# Patient Record
Sex: FEMALE | URBAN - METROPOLITAN AREA
[De-identification: names, ages, dates, MRNs, and addresses within clinical notes are randomized per-mention and may not be internally consistent; named-entity substitution may affect disease eponyms.]

---

## 2017-02-03 RX ORDER — HYDROCHLOROTHIAZIDE 12.5 MG/1
CAPSULE ORAL
Qty: 30 CAP | Refills: 0 | Status: SHIPPED | OUTPATIENT
Start: 2017-02-03 | End: 2018-04-10 | Stop reason: ALTCHOICE

## 2017-02-03 NOTE — LETTER
2/6/2017 9:11 AM 
 
Ms. Lakesha Uriostegui [address] [address] Dear Ms. Maldonado: 
 
We've missed you! Please call our office at [phone] and schedule a follow up appointment for your continued care. Patient overdue for appointment with Dr Ulises Grey for follow up on issues and medications. Sincerely, Garett Pablo MD

## 2017-02-08 ENCOUNTER — TELEPHONE (OUTPATIENT)
Dept: INTERNAL MEDICINE CLINIC | Age: 57
End: 2017-02-08

## 2017-02-08 NOTE — TELEPHONE ENCOUNTER
Patient wants to know if she will need to do labs at her appt because if so she would schedule a am appt instead of pm appt. Requesting a return call.

## 2017-02-08 NOTE — TELEPHONE ENCOUNTER
Writer left detailed message for patient stating that Dr Charity Petersen will do labs at appointment so it is best to make an appointment for in the morning, asked for a return call back with any questions or concerns.

## 2017-02-28 ENCOUNTER — OFFICE VISIT (OUTPATIENT)
Dept: INTERNAL MEDICINE CLINIC | Age: 57
End: 2017-02-28

## 2017-02-28 VITALS
RESPIRATION RATE: 14 BRPM | WEIGHT: 157 LBS | TEMPERATURE: 99.5 F | DIASTOLIC BLOOD PRESSURE: 85 MMHG | HEIGHT: 65 IN | OXYGEN SATURATION: 98 % | HEART RATE: 87 BPM | BODY MASS INDEX: 26.16 KG/M2 | SYSTOLIC BLOOD PRESSURE: 138 MMHG

## 2017-02-28 DIAGNOSIS — Z72.0 TOBACCO ABUSE DISORDER: ICD-10-CM

## 2017-02-28 DIAGNOSIS — I10 ESSENTIAL HYPERTENSION: Primary | ICD-10-CM

## 2017-02-28 DIAGNOSIS — R63.5 WEIGHT GAIN: ICD-10-CM

## 2017-02-28 RX ORDER — TRIAMTERENE/HYDROCHLOROTHIAZID 37.5-25 MG
1 TABLET ORAL DAILY
Qty: 90 TAB | Refills: 3 | Status: SHIPPED | OUTPATIENT
Start: 2017-02-28 | End: 2018-03-04 | Stop reason: SDUPTHER

## 2017-02-28 NOTE — PROGRESS NOTES
Subjective:   Keyana Barraza is a 49 y.o. female with hypertension. Current Outpatient Prescriptions   Medication Sig Dispense Refill    hydroCHLOROthiazide (MICROZIDE) 12.5 mg capsule TAKE 1 TABLET BY MOUTH EVERY DAY 30 Cap 0      Hypertension ROS: taking medications as instructed, no medication side effects noted, home BP monitoring in range of 258'T systolic over 23'H diastolic, no TIA's, no chest pain on exertion, no dyspnea on exertion, no swelling of ankles. New concerns: less stress. Happy good job    Wt Readings from Last 3 Encounters:   02/28/17 157 lb (71.2 kg)   01/12/16 154 lb (69.9 kg)   10/28/14 148 lb (67.1 kg)     Social History   Substance Use Topics    Smoking status: Current Every Day Smoker     Packs/day: 0.30     Years: 8.00     Types: Cigarettes    Smokeless tobacco: Never Used    Alcohol use No     Vitals:    02/28/17 1614 02/28/17 1633   BP: 139/80 138/85   Pulse: 87    Resp: 14    Temp: 99.5 °F (37.5 °C)    TempSrc: Oral    SpO2: 98%    Weight: 157 lb (71.2 kg)    Height: 5' 5\" (1.651 m)          Objective:   Visit Vitals    /80 (BP 1 Location: Left arm, BP Patient Position: Sitting)    Pulse 87    Temp 99.5 °F (37.5 °C) (Oral)    Resp 14    Ht 5' 5\" (1.651 m)    Wt 157 lb (71.2 kg)    SpO2 98%    BMI 26.13 kg/m2      Appearance alert, well appearing, and in no distress and oriented to person, place, and time. General exam BP noted to be  Borderline controlled today in office, S1, S2 normal, no gallop, no murmur, chest clear, no JVD, no HSM, no edema. Lab review: no lab studies available for review at time of visit.    Lab Results   Component Value Date/Time    Cholesterol, total 220 02/10/2016 07:48 AM    HDL Cholesterol 49 02/10/2016 07:48 AM    LDL, calculated 135 02/10/2016 07:48 AM    Triglyceride 182 02/10/2016 07:48 AM    CHOL/HDL Ratio 3.0 07/01/2010 09:55 AM     Lab Results   Component Value Date/Time    GFR est  02/10/2016 07:48 AM    GFR est non-YOGESH 103 02/10/2016 07:48 AM    Creatinine 0.63 02/10/2016 07:48 AM    BUN 7 02/10/2016 07:48 AM    Sodium 139 02/10/2016 07:48 AM    Potassium 3.9 02/10/2016 07:48 AM    Chloride 99 02/10/2016 07:48 AM    CO2 26 02/10/2016 07:48 AM      Wt Readings from Last 3 Encounters:   02/28/17 157 lb (71.2 kg)   01/12/16 154 lb (69.9 kg)   10/28/14 148 lb (67.1 kg)       Assessment:    Hypertension borderline controlled, stable. Will change to maxzide 25    Plan:   orders and follow up as documented in patient record  the following changes are made - lower the dose appropriate. Mansi Menchaca was seen today for hypertension. Diagnoses and all orders for this visit:    Essential hypertension  -     CBC WITH AUTOMATED DIFF  -     LIPID PANEL  -     METABOLIC PANEL, COMPREHENSIVE    Weight gain  -     CBC WITH AUTOMATED DIFF  -     TSH 3RD GENERATION    Tobacco abuse disorder    Other orders  -     triamterene-hydroCHLOROthiazide (MAXZIDE) 37.5-25 mg per tablet; Take 1 Tab by mouth daily. The patient was counseled on the dangers of tobacco use, and was advised to quit. Reviewed strategies to maximize success, including removing cigarettes and smoking materials from environment and stress management. The patient was counseled on the dangers of tobacco use, and was advised to quit. Reviewed strategies to maximize success, including removing cigarettes and smoking materials from environment, substitution of other forms of reinforcement and support of family/friends.

## 2017-02-28 NOTE — MR AVS SNAPSHOT
Visit Information Date & Time Provider Department Dept. Phone Encounter #  
 2/28/2017  4:00 PM Niko Diaz MD Formerly [facility] [phone] [id number] Upcoming Health Maintenance Date Due Hepatitis C Screening 1960 COLONOSCOPY 8/17/1980 Pneumococcal 19-64 Medium Risk (1 of 1 - PPSV23) 8/17/1981 DTaP/Tdap/Td series (1 - Tdap) 8/17/1983 BREAST CANCER SCRN MAMMOGRAM 8/17/2012 PAP AKA CERVICAL CYTOLOGY 4/7/2014 Allergies as of 2/28/2017  Review Complete On: 2/28/2017 By: Yoel Gutierrez LPN Severity Noted Reaction Type Reactions Codeine  05/12/2010    Other (comments)  
 hallucination Sulfa (Sulfonamide Antibiotics)  05/12/2010    Nausea Only Current Immunizations  Reviewed on 1/12/2016 No immunizations on file. Not reviewed this visit You Were Diagnosed With   
  
 Codes Comments Essential hypertension    -  Primary ICD-10-CM: I10 
ICD-9-CM: 401.9 Weight gain     ICD-10-CM: R63.5 ICD-9-CM: 783.1 Vitals BP  
  
  
  
  
  
 138/85 Vitals History BMI and BSA Data Body Mass Index Body Surface Area  
 26.13 kg/m 2 1.81 m 2 Preferred Pharmacy Pharmacy Name Phone [id number]- [place], [address] AT [email or web address] [phone] Your Updated Medication List  
  
   
This list is accurate as of: 2/28/17  4:45 PM.  Always use your most recent med list.  
  
  
  
  
 hydroCHLOROthiazide 12.5 mg capsule Commonly known as:  Marea Heaps TAKE 1 TABLET BY MOUTH EVERY DAY  
  
 triamterene-hydroCHLOROthiazide 37.5-25 mg per tablet Commonly known as:  Padma Sportsman Take 1 Tab by mouth daily. Prescriptions Sent to Pharmacy Refills  
 triamterene-hydroCHLOROthiazide (MAXZIDE) 37.5-25 mg per tablet 3 Sig: Take 1 Tab by mouth daily. Class: Normal  
 Pharmacy: [id number] - [place], [address]  AT [facility] #: [phone] Route: Oral  
  
We Performed the Following CBC WITH AUTOMATED DIFF [19623 CPT(R)] LIPID PANEL [51336 CPT(R)] METABOLIC PANEL, COMPREHENSIVE [70840 CPT(R)] TSH 3RD GENERATION [91142 CPT(R)] Introducing [facility]! [facility] introduces [employer] patient portal. Now you can access parts of your medical record, email your doctor's office, and request medication refills online. 1. In your internet browser, go to [email or web address] 2. Click on the First Time User? Click Here link in the Sign In box. You will see the New Member Sign Up page. 3. Enter your Crowdcube Access Code exactly as it appears below. You will not need to use this code after youve completed the sign-up process. If you do not sign up before the expiration date, you must request a new code. · Crowdcube Access Code: [email or web address] Expires: 5/29/2017  4:41 PM 
 
4. Enter the last four digits of your Social Security Number (xxxx) and Date of Birth (mm/dd/yyyy) as indicated and click Submit. You will be taken to the next sign-up page. 5. Create a Crowdcube ID. This will be your Crowdcube login ID and cannot be changed, so think of one that is secure and easy to remember. 6. Create a Crowdcube password. You can change your password at any time. 7. Enter your Password Reset Question and Answer. This can be used at a later time if you forget your password. 8. Enter your e-mail address. You will receive e-mail notification when new information is available in [address] 9. Click Sign Up. You can now view and download portions of your medical record. 10. Click the Download Summary menu link to download a portable copy of your medical information. If you have questions, please visit the Frequently Asked Questions section of the Crowdcube website. Remember, Crowdcube is NOT to be used for urgent needs. For medical emergencies, dial 911. Now available from your iPhone and Android! Please provide this summary of care documentation to your next provider. Your primary care clinician is listed as Tom Wiggins. If you have any questions after today's visit, please call [phone].

## 2017-03-03 LAB
ALBUMIN SERPL-MCNC: 4.4 G/DL (ref 3.5–5.5)
ALBUMIN/GLOB SERPL: 1.7 {RATIO} (ref 1.1–2.5)
ALP SERPL-CCNC: 84 IU/L (ref 39–117)
ALT SERPL-CCNC: 25 IU/L (ref 0–32)
AST SERPL-CCNC: 23 IU/L (ref 0–40)
BASOPHILS # BLD AUTO: 0 X10E3/UL (ref 0–0.2)
BASOPHILS NFR BLD AUTO: 0 %
BILIRUB SERPL-MCNC: 0.2 MG/DL (ref 0–1.2)
BUN SERPL-MCNC: 10 MG/DL (ref 6–24)
BUN/CREAT SERPL: 16 (ref 9–23)
CALCIUM SERPL-MCNC: 9.6 MG/DL (ref 8.7–10.2)
CHLORIDE SERPL-SCNC: 97 MMOL/L (ref 96–106)
CHOLEST SERPL-MCNC: 219 MG/DL (ref 100–199)
CO2 SERPL-SCNC: 27 MMOL/L (ref 18–29)
CREAT SERPL-MCNC: 0.63 MG/DL (ref 0.57–1)
EOSINOPHIL # BLD AUTO: 0.1 X10E3/UL (ref 0–0.4)
EOSINOPHIL NFR BLD AUTO: 1 %
ERYTHROCYTE [DISTWIDTH] IN BLOOD BY AUTOMATED COUNT: 14.1 % (ref 12.3–15.4)
GLOBULIN SER CALC-MCNC: 2.6 G/DL (ref 1.5–4.5)
GLUCOSE SERPL-MCNC: 100 MG/DL (ref 65–99)
HCT VFR BLD AUTO: 40.2 % (ref 34–46.6)
HDLC SERPL-MCNC: 48 MG/DL
HGB BLD-MCNC: 13.5 G/DL (ref 11.1–15.9)
IMM GRANULOCYTES # BLD: 0 X10E3/UL (ref 0–0.1)
IMM GRANULOCYTES NFR BLD: 0 %
INTERPRETATION, 910389: NORMAL
LDLC SERPL CALC-MCNC: 123 MG/DL (ref 0–99)
LYMPHOCYTES # BLD AUTO: 3.7 X10E3/UL (ref 0.7–3.1)
LYMPHOCYTES NFR BLD AUTO: 38 %
MCH RBC QN AUTO: 29.9 PG (ref 26.6–33)
MCHC RBC AUTO-ENTMCNC: 33.6 G/DL (ref 31.5–35.7)
MCV RBC AUTO: 89 FL (ref 79–97)
MONOCYTES # BLD AUTO: 0.5 X10E3/UL (ref 0.1–0.9)
MONOCYTES NFR BLD AUTO: 6 %
NEUTROPHILS # BLD AUTO: 5.4 X10E3/UL (ref 1.4–7)
NEUTROPHILS NFR BLD AUTO: 55 %
PLATELET # BLD AUTO: 318 X10E3/UL (ref 150–379)
POTASSIUM SERPL-SCNC: 3.5 MMOL/L (ref 3.5–5.2)
PROT SERPL-MCNC: 7 G/DL (ref 6–8.5)
RBC # BLD AUTO: 4.51 X10E6/UL (ref 3.77–5.28)
SODIUM SERPL-SCNC: 139 MMOL/L (ref 134–144)
TRIGL SERPL-MCNC: 238 MG/DL (ref 0–149)
TSH SERPL DL<=0.005 MIU/L-ACNC: 2.43 UIU/ML (ref 0.45–4.5)
VLDLC SERPL CALC-MCNC: 48 MG/DL (ref 5–40)
WBC # BLD AUTO: 9.7 X10E3/UL (ref 3.4–10.8)

## 2018-03-05 ENCOUNTER — TELEPHONE (OUTPATIENT)
Dept: INTERNAL MEDICINE CLINIC | Age: 58
End: 2018-03-05

## 2018-04-10 ENCOUNTER — OFFICE VISIT (OUTPATIENT)
Dept: INTERNAL MEDICINE CLINIC | Age: 58
End: 2018-04-10

## 2018-04-10 VITALS
HEIGHT: 65 IN | BODY MASS INDEX: 25.66 KG/M2 | SYSTOLIC BLOOD PRESSURE: 152 MMHG | OXYGEN SATURATION: 98 % | DIASTOLIC BLOOD PRESSURE: 86 MMHG | RESPIRATION RATE: 16 BRPM | WEIGHT: 154 LBS | HEART RATE: 74 BPM

## 2018-04-10 DIAGNOSIS — I10 ESSENTIAL HYPERTENSION: Primary | ICD-10-CM

## 2018-04-10 DIAGNOSIS — E78.5 DYSLIPIDEMIA: ICD-10-CM

## 2018-04-10 RX ORDER — LISINOPRIL AND HYDROCHLOROTHIAZIDE 20; 25 MG/1; MG/1
1 TABLET ORAL DAILY
Qty: 90 TAB | Refills: 3 | Status: SHIPPED | OUTPATIENT
Start: 2018-04-10 | End: 2018-07-18 | Stop reason: ALTCHOICE

## 2018-04-10 NOTE — PROGRESS NOTES
Coordination of Care Questions    1. Have you been to the ER, urgent care clinic since your last visit? No       Hospitalized since your last visit? No    2. Have you seen or consulted any other health care providers outside of the [address] [place] [address] since your last visit? Include any pap smears or colon screening.  No

## 2018-04-11 LAB
ALBUMIN SERPL-MCNC: 4.5 G/DL (ref 3.5–5.5)
ALBUMIN/GLOB SERPL: 2 {RATIO} (ref 1.2–2.2)
ALP SERPL-CCNC: 77 IU/L (ref 39–117)
ALT SERPL-CCNC: 16 IU/L (ref 0–32)
AST SERPL-CCNC: 19 IU/L (ref 0–40)
BILIRUB SERPL-MCNC: 0.4 MG/DL (ref 0–1.2)
BUN SERPL-MCNC: 10 MG/DL (ref 6–24)
BUN/CREAT SERPL: 14 (ref 9–23)
CALCIUM SERPL-MCNC: 9.7 MG/DL (ref 8.7–10.2)
CHLORIDE SERPL-SCNC: 98 MMOL/L (ref 96–106)
CHOLEST SERPL-MCNC: 212 MG/DL (ref 100–199)
CO2 SERPL-SCNC: 30 MMOL/L (ref 18–29)
CREAT SERPL-MCNC: 0.69 MG/DL (ref 0.57–1)
GFR SERPLBLD CREATININE-BSD FMLA CKD-EPI: 113 ML/MIN/1.73
GFR SERPLBLD CREATININE-BSD FMLA CKD-EPI: 98 ML/MIN/1.73
GLOBULIN SER CALC-MCNC: 2.3 G/DL (ref 1.5–4.5)
GLUCOSE SERPL-MCNC: 96 MG/DL (ref 65–99)
HDLC SERPL-MCNC: 52 MG/DL
INTERPRETATION, 910389: NORMAL
LDLC SERPL CALC-MCNC: 123 MG/DL (ref 0–99)
POTASSIUM SERPL-SCNC: 4.5 MMOL/L (ref 3.5–5.2)
PROT SERPL-MCNC: 6.8 G/DL (ref 6–8.5)
SODIUM SERPL-SCNC: 141 MMOL/L (ref 134–144)
TRIGL SERPL-MCNC: 184 MG/DL (ref 0–149)
VLDLC SERPL CALC-MCNC: 37 MG/DL (ref 5–40)

## 2018-06-04 RX ORDER — TRIAMTERENE/HYDROCHLOROTHIAZID 37.5-25 MG
1 TABLET ORAL DAILY
Qty: 90 TAB | Refills: 0 | Status: SHIPPED | OUTPATIENT
Start: 2018-06-04 | End: 2018-07-18 | Stop reason: SDUPTHER

## 2018-06-04 NOTE — TELEPHONE ENCOUNTER
Pt is calling she wants to speak with nurse in reference to the medication that  prescribed to her Lisinopril-Hydrochlorothiazide 20-25 mg.  Pt may be reached at [phone]

## 2018-06-04 NOTE — TELEPHONE ENCOUNTER
The patient is currently finishing up Triam/HCTZ and during her last visit it was discussed that she would change to Lisinopril/HCTZ. At this time, she does not feel comfortable switching meds. Her most recent readings have been 115/64 and 121/68. She would like a refill on Triam/HCTZ and will perhaps discuss changing at a later time with Dr Josselin Theodore.

## 2018-07-18 RX ORDER — TRIAMTERENE/HYDROCHLOROTHIAZID 37.5-25 MG
1 TABLET ORAL DAILY
Qty: 90 TAB | Refills: 1 | Status: SHIPPED | OUTPATIENT
Start: 2018-07-18 | End: 2019-02-25 | Stop reason: SDUPTHER

## 2019-02-26 RX ORDER — TRIAMTERENE/HYDROCHLOROTHIAZID 37.5-25 MG
1 TABLET ORAL DAILY
Qty: 90 TAB | Refills: 1 | Status: SHIPPED | OUTPATIENT
Start: 2019-02-26 | End: 2019-04-29 | Stop reason: SDUPTHER

## 2019-04-29 ENCOUNTER — OFFICE VISIT (OUTPATIENT)
Dept: INTERNAL MEDICINE CLINIC | Age: 59
End: 2019-04-29

## 2019-04-29 VITALS
HEIGHT: 65 IN | TEMPERATURE: 97.3 F | SYSTOLIC BLOOD PRESSURE: 127 MMHG | DIASTOLIC BLOOD PRESSURE: 85 MMHG | OXYGEN SATURATION: 100 % | WEIGHT: 154 LBS | RESPIRATION RATE: 18 BRPM | BODY MASS INDEX: 25.66 KG/M2 | HEART RATE: 89 BPM

## 2019-04-29 DIAGNOSIS — Z23 ENCOUNTER FOR IMMUNIZATION: ICD-10-CM

## 2019-04-29 DIAGNOSIS — E78.5 DYSLIPIDEMIA: ICD-10-CM

## 2019-04-29 DIAGNOSIS — Z72.0 TOBACCO ABUSE DISORDER: ICD-10-CM

## 2019-04-29 DIAGNOSIS — I10 ESSENTIAL HYPERTENSION: Primary | ICD-10-CM

## 2019-04-29 RX ORDER — TRIAMTERENE/HYDROCHLOROTHIAZID 37.5-25 MG
1 TABLET ORAL DAILY
Qty: 90 TAB | Refills: 3 | Status: SHIPPED | OUTPATIENT
Start: 2019-04-29 | End: 2020-05-27

## 2019-04-29 RX ORDER — TRIAMTERENE/HYDROCHLOROTHIAZID 37.5-25 MG
1 TABLET ORAL DAILY
Qty: 90 TAB | Refills: 1 | Status: SHIPPED | OUTPATIENT
Start: 2019-04-29 | End: 2019-04-29 | Stop reason: SDUPTHER

## 2019-04-29 NOTE — PROGRESS NOTES
1. Have you been to the ER, urgent care clinic since your last visit? Hospitalized since your last visit? No 
 
2. Have you seen or consulted any other health care providers outside of the [address] [place] [address] since your last visit? Include any pap smears or colon screening.  No

## 2019-04-29 NOTE — PROGRESS NOTES
Subjective:  
Kurtis Elkins is a 66 y.o. female with hypertension. Current Outpatient Medications Medication Sig Dispense Refill  triamterene-hydroCHLOROthiazide (MAXZIDE) 37.5-25 mg per tablet Take 1 Tab by mouth daily. 90 Tab 1 Hypertension ROS: taking medications as instructed, no medication side effects noted, home BP monitoring in range of 425'Y systolic over 76'O diastolic, no TIA's, no chest pain on exertion, no dyspnea on exertion, no swelling of ankles. New concerns: less stress. Happy good job Still smokes worried Wt Readings from Last 3 Encounters:  
04/29/19 154 lb (69.9 kg) 04/10/18 154 lb (69.9 kg) 02/28/17 157 lb (71.2 kg) Social History Tobacco Use  Smoking status: Current Every Day Smoker Packs/day: 0.30 Years: 8.00 Pack years: 2.40 Types: Cigarettes  Smokeless tobacco: Never Used Substance Use Topics  Alcohol use: No  
 Drug use: No  
 
Vitals:  
 04/29/19 1640 BP: 148/78 Pulse: 89 Resp: 18 Temp: 97.3 °F (36.3 °C) TempSrc: Oral  
SpO2: 100% Weight: 154 lb (69.9 kg) Height: 5' 5\" (1.651 m) Vitals:  
 04/29/19 1640 BP: 148/78 Pulse: 89 Resp: 18 Temp: 97.3 °F (36.3 °C) TempSrc: Oral  
SpO2: 100% Weight: 154 lb (69.9 kg) Height: 5' 5\" (1.651 m) Wt Readings from Last 3 Encounters:  
04/29/19 154 lb (69.9 kg) 04/10/18 154 lb (69.9 kg) 02/28/17 157 lb (71.2 kg) Objective:  
Visit Vitals /78 (BP 1 Location: Left arm, BP Patient Position: Sitting) Pulse 89 Temp 97.3 °F (36.3 °C) (Oral) Resp 18 Ht 5' 5\" (1.651 m) Wt 154 lb (69.9 kg) SpO2 100% BMI 25.63 kg/m² Appearance alert, well appearing, and in no distress and oriented to person, place, and time. General exam BP noted to be  Borderline controlled today in office, S1, S2 normal, no gallop, no murmur, chest clear, no JVD, no HSM, no edema. Lab review: no lab studies available for review at time of visit. Lab Results Component Value Date/Time Cholesterol, total 212 (H) 04/10/2018 09:14 AM  
 HDL Cholesterol 52 04/10/2018 09:14 AM  
 LDL, calculated 123 (H) 04/10/2018 09:14 AM  
 Triglyceride 184 (H) 04/10/2018 09:14 AM  
 CHOL/HDL Ratio 3.0 07/01/2010 09:55 AM  
 
Lab Results Component Value Date/Time GFR est  04/10/2018 09:14 AM  
 GFR est non-AA 98 04/10/2018 09:14 AM  
 Creatinine 0.69 04/10/2018 09:14 AM  
 BUN 10 04/10/2018 09:14 AM  
 Sodium 141 04/10/2018 09:14 AM  
 Potassium 4.5 04/10/2018 09:14 AM  
 Chloride 98 04/10/2018 09:14 AM  
 CO2 30 (H) 04/10/2018 09:14 AM  
  
Wt Readings from Last 3 Encounters:  
04/29/19 154 lb (69.9 kg) 04/10/18 154 lb (69.9 kg) 02/28/17 157 lb (71.2 kg) Assessment: Hypertension better controlled, stable. Plan:  
orders and follow up as documented in patient record 
the following changes are made - lower the dose appropriate. The patient was counseled on the dangers of tobacco use, and was advised to quit. Reviewed strategies to maximize success, including removing cigarettes and smoking materials from environment and stress management. The patient was counseled on the dangers of tobacco use, and was advised to quit. Reviewed strategies to maximize success, including removing cigarettes and smoking materials from environment, substitution of other forms of reinforcement and support of family/friends. Offered lung cancer screen Yearly flu vaccine Offered pneumovax Diagnoses and all orders for this visit: 1. Essential hypertension 2. Dyslipidemia 3. Tobacco abuse disorder Other orders 
-     triamterene-hydroCHLOROthiazide (MAXZIDE) 37.5-25 mg per tablet; Take 1 Tab by mouth daily.

## 2020-06-02 ENCOUNTER — VIRTUAL VISIT (OUTPATIENT)
Dept: INTERNAL MEDICINE CLINIC | Age: 60
End: 2020-06-02

## 2020-06-02 VITALS
SYSTOLIC BLOOD PRESSURE: 135 MMHG | BODY MASS INDEX: 25.66 KG/M2 | DIASTOLIC BLOOD PRESSURE: 80 MMHG | WEIGHT: 154 LBS | HEIGHT: 65 IN

## 2020-06-02 DIAGNOSIS — I10 ESSENTIAL HYPERTENSION: Primary | ICD-10-CM

## 2020-06-02 RX ORDER — TRIAMTERENE/HYDROCHLOROTHIAZID 37.5-25 MG
1 TABLET ORAL DAILY
Qty: 90 TAB | Refills: 1 | Status: SHIPPED | OUTPATIENT
Start: 2020-06-02 | End: 2021-03-02 | Stop reason: SDUPTHER

## 2020-06-02 NOTE — PROGRESS NOTES
Subjective: telephone only  Mikaela Elliott is a 64 y.o. female with hypertension. Current Outpatient Medications   Medication Sig Dispense Refill    triamterene-hydroCHLOROthiazide (MAXZIDE) 37.5-25 mg per tablet TAKE 1 TABLET BY MOUTH EVERY DAY 90 Tab 0      Hypertension ROS: taking medications as instructed, no medication side effects noted, home BP monitoring in range of 130systolic over 69'X diastolic, no TIA's, no chest pain on exertion, no dyspnea on exertion, no swelling of ankles. New concerns: less stress.  Happy good job  Still smokes worried       Wt Readings from Last 3 Encounters:   06/02/20 154 lb (69.9 kg)   04/29/19 154 lb (69.9 kg)   04/10/18 154 lb (69.9 kg)     Social History     Tobacco Use    Smoking status: Current Every Day Smoker     Packs/day: 0.30     Years: 8.00     Pack years: 2.40     Types: Cigarettes    Smokeless tobacco: Never Used   Substance Use Topics    Alcohol use: No    Drug use: No     Vitals:    06/02/20 1529   Weight: 154 lb (69.9 kg)   Height: 5' 5\" (1.651 m)     Vitals:    06/02/20 1529   Weight: 154 lb (69.9 kg)   Height: 5' 5\" (1.651 m)     Wt Readings from Last 3 Encounters:   06/02/20 154 lb (69.9 kg)   04/29/19 154 lb (69.9 kg)   04/10/18 154 lb (69.9 kg)     BP Readings from Last 3 Encounters:   06/02/20 135/80   04/29/19 127/85   04/10/18 152/86         Objective:   Visit Vitals  Ht 5' 5\" (1.651 m)   Wt 154 lb (69.9 kg)   BMI 25.63 kg/m²      Appearance alert, sounds fine    Lab Results   Component Value Date/Time    Cholesterol, total 212 (H) 04/10/2018 09:14 AM    HDL Cholesterol 52 04/10/2018 09:14 AM    LDL, calculated 123 (H) 04/10/2018 09:14 AM    Triglyceride 184 (H) 04/10/2018 09:14 AM    CHOL/HDL Ratio 3.0 07/01/2010 09:55 AM     Lab Results   Component Value Date/Time    GFR est  04/10/2018 09:14 AM    GFR est non-AA 98 04/10/2018 09:14 AM    Creatinine 0.69 04/10/2018 09:14 AM    BUN 10 04/10/2018 09:14 AM    Sodium 141 04/10/2018 09:14 AM Potassium 4.5 04/10/2018 09:14 AM    Chloride 98 04/10/2018 09:14 AM    CO2 30 (H) 04/10/2018 09:14 AM      Wt Readings from Last 3 Encounters:   06/02/20 154 lb (69.9 kg)   04/29/19 154 lb (69.9 kg)   04/10/18 154 lb (69.9 kg)       Assessment:    Hypertension  controlled, stable. Plan:   orders and follow up as documented in patient record  the following changes are made - lower the dose appropriate. The patient was counseled on the dangers of tobacco use, and was advised to quit. Reviewed strategies to maximize success, including removing cigarettes and smoking materials from environment and stress management. Diagnoses and all orders for this visit:    1. Essential hypertension    Other orders  -     triamterene-hydroCHLOROthiazide (MAXZIDE) 37.5-25 mg per tablet; Take 1 Tab by mouth daily.       Labs next time    Total time 16 minutes

## 2020-11-24 ENCOUNTER — PATIENT MESSAGE (OUTPATIENT)
Dept: INTERNAL MEDICINE CLINIC | Age: 60
End: 2020-11-24

## 2020-12-05 ENCOUNTER — TELEPHONE (OUTPATIENT)
Dept: INTERNAL MEDICINE CLINIC | Age: 60
End: 2020-12-05

## 2020-12-05 NOTE — TELEPHONE ENCOUNTER
LVM for patient to call office regarding her mammogram. New order needed or if she has had that done already. No MyChart set up, letter sent sent to patient home.

## 2020-12-05 NOTE — LETTER
12/5/2020 12:46 PM 
 
Ms. Kathleen Diop [address] [facility] [address] [address] [facility] and Dr. Camron Murrieta would like to remind you that our records indicate you are due for a mammogram. If you have recently had a mammogram please notify the office so that we may update your medical record accordingly. Please feel free to reach out to the office with your questions. Kind Regards,  
 
 
 
[address] [facility] [address] [facility], [address] [place], [address] [place] [address] [address]kw[address] Office: [phone] Fax: [phone] Sincerely, Arthur Farah MD

## 2021-03-02 ENCOUNTER — OFFICE VISIT (OUTPATIENT)
Dept: INTERNAL MEDICINE CLINIC | Age: 61
End: 2021-03-02
Payer: COMMERCIAL

## 2021-03-02 VITALS
HEIGHT: 65 IN | SYSTOLIC BLOOD PRESSURE: 140 MMHG | RESPIRATION RATE: 18 BRPM | WEIGHT: 173 LBS | DIASTOLIC BLOOD PRESSURE: 80 MMHG | TEMPERATURE: 98.4 F | HEART RATE: 80 BPM | BODY MASS INDEX: 28.82 KG/M2 | OXYGEN SATURATION: 100 %

## 2021-03-02 DIAGNOSIS — I10 ESSENTIAL HYPERTENSION: Primary | ICD-10-CM

## 2021-03-02 DIAGNOSIS — Z72.0 TOBACCO ABUSE: ICD-10-CM

## 2021-03-02 PROCEDURE — 99213 OFFICE O/P EST LOW 20 MIN: CPT | Performed by: INTERNAL MEDICINE

## 2021-03-02 RX ORDER — TRIAMTERENE/HYDROCHLOROTHIAZID 37.5-25 MG
1 TABLET ORAL DAILY
Qty: 90 TAB | Refills: 1 | Status: SHIPPED | OUTPATIENT
Start: 2021-03-02 | End: 2021-08-30

## 2021-03-02 NOTE — PROGRESS NOTES
Chief Complaint   Patient presents with    Blood Pressure Check     follow up    Anxiety    Medication Refill     90 day refills          1. Have you been to the ER, urgent care clinic since your last visit? Hospitalized since your last visit? No    2. Have you seen or consulted any other health care providers outside of the [address] [place] [address] since your last visit? Include any pap smears or colon screening.  No

## 2021-03-03 NOTE — PROGRESS NOTES
Subjective:   Phyllis Walker is a 64 y.o. female with hypertension. Current Outpatient Medications   Medication Sig Dispense Refill    triamterene-hydroCHLOROthiazide (MAXZIDE) 37.5-25 mg per tablet Take 1 Tab by mouth daily. 90 Tab 1      Hypertension ROS: taking medications as instructed, no medication side effects noted, home BP monitoring in range of 545'A systolic over 59'S diastolic, no TIA's, no chest pain on exertion, no dyspnea on exertion, no swelling of ankles. New concerns: less stress. Happy good job  Still smokes worried       Wt Readings from Last 3 Encounters:   03/02/21 173 lb (78.5 kg)   06/02/20 154 lb (69.9 kg)   04/29/19 154 lb (69.9 kg)     Social History     Tobacco Use    Smoking status: Current Every Day Smoker     Packs/day: 0.30     Years: 8.00     Pack years: 2.40     Types: Cigarettes    Smokeless tobacco: Never Used   Substance Use Topics    Alcohol use: No    Drug use: No     Vitals:    03/02/21 1519 03/02/21 1549   BP: (!) 185/73 (!) 140/80   Pulse: 80    Resp: 18    Temp: 98.4 °F (36.9 °C)    TempSrc: Temporal    SpO2: 100%    Weight: 173 lb (78.5 kg)    Height: 5' 5\" (1.651 m)      Vitals:    03/02/21 1519 03/02/21 1549   BP: (!) 185/73 (!) 140/80   Pulse: 80    Resp: 18    Temp: 98.4 °F (36.9 °C)    TempSrc: Temporal    SpO2: 100%    Weight: 173 lb (78.5 kg)    Height: 5' 5\" (1.651 m)      Wt Readings from Last 3 Encounters:   03/02/21 173 lb (78.5 kg)   06/02/20 154 lb (69.9 kg)   04/29/19 154 lb (69.9 kg)         Objective:   Visit Vitals  BP (!) 140/80   Pulse 80   Temp 98.4 °F (36.9 °C) (Temporal)   Resp 18   Ht 5' 5\" (1.651 m)   Wt 173 lb (78.5 kg)   SpO2 100%   BMI 28.79 kg/m²      Appearance alert, well appearing, and in no distress and oriented to person, place, and time. General exam BP noted to be  Borderline controlled today in office, S1, S2 normal, no gallop, no murmur, chest clear, no JVD, no HSM, no edema.    Lab review: no lab studies available for review at time of visit. Lab Results   Component Value Date/Time    Cholesterol, total 212 (H) 04/10/2018 09:14 AM    HDL Cholesterol 52 04/10/2018 09:14 AM    LDL, calculated 123 (H) 04/10/2018 09:14 AM    Triglyceride 184 (H) 04/10/2018 09:14 AM    CHOL/HDL Ratio 3.0 07/01/2010 09:55 AM     Lab Results   Component Value Date/Time    GFR est  04/10/2018 09:14 AM    GFR est non-AA 98 04/10/2018 09:14 AM    Creatinine 0.69 04/10/2018 09:14 AM    BUN 10 04/10/2018 09:14 AM    Sodium 141 04/10/2018 09:14 AM    Potassium 4.5 04/10/2018 09:14 AM    Chloride 98 04/10/2018 09:14 AM    CO2 30 (H) 04/10/2018 09:14 AM      Wt Readings from Last 3 Encounters:   03/02/21 173 lb (78.5 kg)   06/02/20 154 lb (69.9 kg)   04/29/19 154 lb (69.9 kg)       Assessment:    Hypertension better controlled, stable. Weight gain related to covid restrictions  Plan:   orders and follow up as documented in patient record  the following changes are made - lower the dose appropriate. The patient was counseled on the dangers of tobacco use, and was advised to quit. Reviewed strategies to maximize success, including removing cigarettes and smoking materials from environment and stress management. The patient was counseled on the dangers of tobacco use, and was advised to quit. Reviewed strategies to maximize success, including removing cigarettes and smoking materials from environment, substitution of other forms of reinforcement and support of family/friends. Offered lung cancer screen  Yearly flu vaccine  Offered pneumovax  covid vaccinations reviewed and priority reviewed and my advice to get vaccinated reviewed    Diagnoses and all orders for this visit:    1. Essential hypertension    2. Tobacco abuse    Other orders  -     triamterene-hydroCHLOROthiazide (MAXZIDE) 37.5-25 mg per tablet; Take 1 Tab by mouth daily.       Advise exercise   Stop smoking

## 2021-08-30 RX ORDER — TRIAMTERENE/HYDROCHLOROTHIAZID 37.5-25 MG
TABLET ORAL
Qty: 90 TABLET | Refills: 1 | Status: SHIPPED | OUTPATIENT
Start: 2021-08-30 | End: 2021-09-01 | Stop reason: ALTCHOICE

## 2021-09-01 ENCOUNTER — OFFICE VISIT (OUTPATIENT)
Dept: INTERNAL MEDICINE CLINIC | Age: 61
End: 2021-09-01
Payer: COMMERCIAL

## 2021-09-01 VITALS
WEIGHT: 169 LBS | SYSTOLIC BLOOD PRESSURE: 150 MMHG | HEART RATE: 83 BPM | OXYGEN SATURATION: 100 % | DIASTOLIC BLOOD PRESSURE: 70 MMHG | BODY MASS INDEX: 28.16 KG/M2 | HEIGHT: 65 IN | TEMPERATURE: 98.9 F | RESPIRATION RATE: 18 BRPM

## 2021-09-01 DIAGNOSIS — I10 ESSENTIAL HYPERTENSION: Primary | ICD-10-CM

## 2021-09-01 PROCEDURE — 99213 OFFICE O/P EST LOW 20 MIN: CPT | Performed by: INTERNAL MEDICINE

## 2021-09-01 RX ORDER — LOSARTAN POTASSIUM AND HYDROCHLOROTHIAZIDE 25; 100 MG/1; MG/1
1 TABLET ORAL DAILY
Qty: 90 TABLET | Refills: 1 | Status: SHIPPED | OUTPATIENT
Start: 2021-09-01 | End: 2021-09-22 | Stop reason: DRUGHIGH

## 2021-09-01 NOTE — PROGRESS NOTES
Subjective:   Leila Toledo is a 63 y.o. female with hypertension. Current Outpatient Medications   Medication Sig Dispense Refill    losartan-hydroCHLOROthiazide (HYZAAR) 100-25 mg per tablet Take 1 Tablet by mouth daily. 90 Tablet 1      Hypertension ROS: taking medications as instructed, no medication side effects noted, home BP monitoring in range of 343'K systolic over 92'K diastolic, no TIA's, no chest pain on exertion, no dyspnea on exertion, no swelling of ankles. New concerns: less stress. Happy good job  Still smokes worried       Wt Readings from Last 3 Encounters:   09/01/21 169 lb (76.7 kg)   03/02/21 173 lb (78.5 kg)   06/02/20 154 lb (69.9 kg)     Social History     Tobacco Use    Smoking status: Current Every Day Smoker     Packs/day: 0.30     Years: 8.00     Pack years: 2.40     Types: Cigarettes    Smokeless tobacco: Never Used   Substance Use Topics    Alcohol use: No    Drug use: No     Vitals:    09/01/21 0824 09/01/21 0841   BP: (!) 167/76 (!) 150/70   Pulse: 83    Resp: 18    Temp: 98.9 °F (37.2 °C)    TempSrc: Temporal    SpO2: 100%    Weight: 169 lb (76.7 kg)    Height: 5' 5\" (1.651 m)      Vitals:    09/01/21 0824 09/01/21 0841   BP: (!) 167/76 (!) 150/70   Pulse: 83    Resp: 18    Temp: 98.9 °F (37.2 °C)    TempSrc: Temporal    SpO2: 100%    Weight: 169 lb (76.7 kg)    Height: 5' 5\" (1.651 m)      Wt Readings from Last 3 Encounters:   09/01/21 169 lb (76.7 kg)   03/02/21 173 lb (78.5 kg)   06/02/20 154 lb (69.9 kg)         Objective:   Visit Vitals  BP (!) 150/70   Pulse 83   Temp 98.9 °F (37.2 °C) (Temporal)   Resp 18   Ht 5' 5\" (1.651 m)   Wt 169 lb (76.7 kg)   SpO2 100%   BMI 28.12 kg/m²      Appearance alert, well appearing, and in no distress and oriented to person, place, and time. General exam BP noted to be  Borderline controlled today in office, S1, S2 normal, no gallop, no murmur, chest clear, no JVD, no HSM, no edema.    Lab review: no lab studies available for review at time of visit. Lab Results   Component Value Date/Time    Cholesterol, total 212 (H) 04/10/2018 09:14 AM    HDL Cholesterol 52 04/10/2018 09:14 AM    LDL, calculated 123 (H) 04/10/2018 09:14 AM    Triglyceride 184 (H) 04/10/2018 09:14 AM    CHOL/HDL Ratio 3.0 07/01/2010 09:55 AM     Lab Results   Component Value Date/Time    GFR est  04/10/2018 09:14 AM    GFR est non-AA 98 04/10/2018 09:14 AM    Creatinine 0.69 04/10/2018 09:14 AM    BUN 10 04/10/2018 09:14 AM    Sodium 141 04/10/2018 09:14 AM    Potassium 4.5 04/10/2018 09:14 AM    Chloride 98 04/10/2018 09:14 AM    CO2 30 (H) 04/10/2018 09:14 AM      Wt Readings from Last 3 Encounters:   09/01/21 169 lb (76.7 kg)   03/02/21 173 lb (78.5 kg)   06/02/20 154 lb (69.9 kg)       Assessment:    Hypertension not controlled, stable. Weight loss   Plan:   Add   Losartan . The patient was counseled on the dangers of tobacco use, and was advised to quit. Reviewed strategies to maximize success, including removing cigarettes and smoking materials from environment and stress management. The patient was counseled on the dangers of tobacco use, and was advised to quit. Reviewed strategies to maximize success, including removing cigarettes and smoking materials from environment, substitution of other forms of reinforcement and support of family/friends. Offered lung cancer screen  Yearly flu vaccine  Offered pneumovax  covid vaccinations reviewed and priority reviewed and my advice to get vaccinated reviewed    Diagnoses and all orders for this visit:    1. Essential hypertension  -     losartan-hydroCHLOROthiazide (HYZAAR) 100-25 mg per tablet; Take 1 Tablet by mouth daily.  -     CBC WITH AUTOMATED DIFF; Future  -     LIPID PANEL; Future  -     METABOLIC PANEL, COMPREHENSIVE;  Future      Advise exercise   Stop smoking

## 2021-09-01 NOTE — PROGRESS NOTES
Chief Complaint   Patient presents with    Hypertension     1. Have you been to the ER, urgent care clinic since your last visit? Hospitalized since your last visit? No    2. Have you seen or consulted any other health care providers outside of the [address] [place] [address] since your last visit? Include any pap smears or colon screening.  No     Visit Vitals  BP (!) 167/76   Pulse 83   Temp 98.9 °F (37.2 °C) (Temporal)   Resp 18   Ht 5' 5\" (1.651 m)   Wt 169 lb (76.7 kg)   SpO2 100%   BMI 28.12 kg/m²

## 2021-09-22 ENCOUNTER — OFFICE VISIT (OUTPATIENT)
Dept: INTERNAL MEDICINE CLINIC | Age: 61
End: 2021-09-22
Payer: COMMERCIAL

## 2021-09-22 VITALS
DIASTOLIC BLOOD PRESSURE: 65 MMHG | TEMPERATURE: 98.6 F | HEART RATE: 89 BPM | HEIGHT: 65 IN | RESPIRATION RATE: 16 BRPM | BODY MASS INDEX: 28.29 KG/M2 | SYSTOLIC BLOOD PRESSURE: 131 MMHG | WEIGHT: 169.8 LBS | OXYGEN SATURATION: 98 %

## 2021-09-22 DIAGNOSIS — I10 ESSENTIAL HYPERTENSION: Primary | ICD-10-CM

## 2021-09-22 PROCEDURE — 99213 OFFICE O/P EST LOW 20 MIN: CPT | Performed by: PHYSICIAN ASSISTANT

## 2021-09-22 RX ORDER — LOSARTAN POTASSIUM AND HYDROCHLOROTHIAZIDE 12.5; 1 MG/1; MG/1
1 TABLET ORAL DAILY
Qty: 30 TABLET | Refills: 2 | Status: SHIPPED | OUTPATIENT
Start: 2021-09-22 | End: 2021-12-20

## 2021-09-22 NOTE — PROGRESS NOTES
1. Have you been to the ER, urgent care clinic since your last visit? Hospitalized since your last visit? No    2. Have you seen or consulted any other health care providers outside of the [address] [place] [address] since your last visit? Include any pap smears or colon screening.  No   Chief Complaint   Patient presents with    Medication Evaluation     foloow up on BP medication

## 2021-09-22 NOTE — PROGRESS NOTES
Rhona Hammond is a 63 y.o. female  Chief Complaint   Patient presents with    Medication Evaluation     foloow up on BP medication     Visit Vitals  /65   Pulse 89   Temp 98.6 °F (37 °C) (Temporal)   Resp 16   Ht 5' 5\" (1.651 m)   Wt 169 lb 12.8 oz (77 kg)   SpO2 98%   BMI 28.26 kg/m²      Health Maintenance Due   Topic Date Due    Hepatitis C Screening  Never done    Pneumococcal 0-64 years (1 of 2 - PPSV23) Never done    COVID-19 Vaccine (1) Never done    DTaP/Tdap/Td series (1 - Tdap) Never done    Cervical cancer screen  Never done    Colorectal Cancer Screening Combo  Never done    Shingrix Vaccine Age 50> (1 of 2) Never done    Breast Cancer Screen Mammogram  Never done    Flu Vaccine (1) Never done       HPI  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 HTN - changed Triamterene/HCTZ 37.5/25 to Losartan/HCTZ 100/25 at last visit due to poor control on Triamterene/HCTZ. Today in office, BP is controlled, but pt feels dizzy/having frequent headaches/fatigued on this new medication. At work, BP is 110//59. BP Readings from Last 3 Encounters:   09/22/21 131/65   09/01/21 (!) 150/70   03/02/21 (!) 140/80     Currently taking:   Key CAD CHF Meds             losartan-hydroCHLOROthiazide (HYZAAR) 100-25 mg per tablet (Taking) Take 1 Tablet by mouth daily. Lab Results   Component Value Date/Time    Creatinine 0.69 04/10/2018 09:14 AM   Pt plans to do labs (previously ordered) soon. ROS  Review of Systems   Constitutional: Positive for malaise/fatigue. Respiratory: Negative for shortness of breath. Cardiovascular: Negative for chest pain and palpitations. Neurological: Positive for dizziness and headaches. Negative for loss of consciousness and weakness. EXAM  Physical Exam  Vitals and nursing note reviewed. Constitutional:       General: She is not in acute distress. Appearance: She is well-developed.    HENT:      Head: Normocephalic and atraumatic. Neck:      Vascular: No JVD. Cardiovascular:      Rate and Rhythm: Normal rate and regular rhythm. Heart sounds: Normal heart sounds. Pulmonary:      Effort: Pulmonary effort is normal. No respiratory distress. Breath sounds: Normal breath sounds. Musculoskeletal:      Cervical back: Neck supple. Skin:     General: Skin is warm and dry. Neurological:      Mental Status: She is alert and oriented to person, place, and time. Psychiatric:         Mood and Affect: Mood normal.         Behavior: Behavior normal.         Thought Content: Thought content normal.         Judgment: Judgment normal.       ASSESSMENT/PLAN  Encounter Diagnoses     ICD-10-CM ICD-9-CM   1.  Essential hypertension  I10 401.9     Orders Placed This Encounter    Decrease dose to: losartan-hydroCHLOROthiazide (HYZAAR) 100-12.5 mg per tablet

## 2021-10-07 LAB
ALBUMIN SERPL-MCNC: 4.4 G/DL (ref 3.8–4.9)
ALBUMIN/GLOB SERPL: 2 {RATIO} (ref 1.2–2.2)
ALP SERPL-CCNC: 87 IU/L (ref 44–121)
ALT SERPL-CCNC: 9 IU/L (ref 0–32)
AST SERPL-CCNC: 13 IU/L (ref 0–40)
BASOPHILS # BLD AUTO: 0 X10E3/UL (ref 0–0.2)
BASOPHILS NFR BLD AUTO: 1 %
BILIRUB SERPL-MCNC: 0.5 MG/DL (ref 0–1.2)
BUN SERPL-MCNC: 13 MG/DL (ref 6–24)
BUN/CREAT SERPL: 16 (ref 9–23)
CALCIUM SERPL-MCNC: 10 MG/DL (ref 8.7–10.2)
CHLORIDE SERPL-SCNC: 103 MMOL/L (ref 96–106)
CHOLEST SERPL-MCNC: 193 MG/DL (ref 100–199)
CO2 SERPL-SCNC: 28 MMOL/L (ref 20–29)
CREAT SERPL-MCNC: 0.79 MG/DL (ref 0.57–1)
EOSINOPHIL # BLD AUTO: 0.1 X10E3/UL (ref 0–0.4)
EOSINOPHIL NFR BLD AUTO: 1 %
ERYTHROCYTE [DISTWIDTH] IN BLOOD BY AUTOMATED COUNT: 13.1 % (ref 11.7–15.4)
GLOBULIN SER CALC-MCNC: 2.2 G/DL (ref 1.5–4.5)
GLUCOSE SERPL-MCNC: 102 MG/DL (ref 65–99)
HCT VFR BLD AUTO: 40.1 % (ref 34–46.6)
HDLC SERPL-MCNC: 41 MG/DL
HGB BLD-MCNC: 13.3 G/DL (ref 11.1–15.9)
IMM GRANULOCYTES # BLD AUTO: 0 X10E3/UL (ref 0–0.1)
IMM GRANULOCYTES NFR BLD AUTO: 0 %
IMP & REVIEW OF LAB RESULTS: NORMAL
LDLC SERPL CALC-MCNC: 123 MG/DL (ref 0–99)
LYMPHOCYTES # BLD AUTO: 2.4 X10E3/UL (ref 0.7–3.1)
LYMPHOCYTES NFR BLD AUTO: 31 %
MCH RBC QN AUTO: 29.4 PG (ref 26.6–33)
MCHC RBC AUTO-ENTMCNC: 33.2 G/DL (ref 31.5–35.7)
MCV RBC AUTO: 89 FL (ref 79–97)
MONOCYTES # BLD AUTO: 0.5 X10E3/UL (ref 0.1–0.9)
MONOCYTES NFR BLD AUTO: 6 %
NEUTROPHILS # BLD AUTO: 4.9 X10E3/UL (ref 1.4–7)
NEUTROPHILS NFR BLD AUTO: 61 %
PLATELET # BLD AUTO: 280 X10E3/UL (ref 150–450)
POTASSIUM SERPL-SCNC: 4.4 MMOL/L (ref 3.5–5.2)
PROT SERPL-MCNC: 6.6 G/DL (ref 6–8.5)
RBC # BLD AUTO: 4.52 X10E6/UL (ref 3.77–5.28)
SODIUM SERPL-SCNC: 144 MMOL/L (ref 134–144)
TRIGL SERPL-MCNC: 164 MG/DL (ref 0–149)
VLDLC SERPL CALC-MCNC: 29 MG/DL (ref 5–40)
WBC # BLD AUTO: 7.9 X10E3/UL (ref 3.4–10.8)

## 2021-10-25 ENCOUNTER — TELEPHONE (OUTPATIENT)
Dept: INTERNAL MEDICINE CLINIC | Age: 61
End: 2021-10-25

## 2021-10-25 ENCOUNTER — DOCUMENTATION ONLY (OUTPATIENT)
Dept: INTERNAL MEDICINE CLINIC | Age: 61
End: 2021-10-25

## 2021-10-25 NOTE — PROGRESS NOTES
Writer contacted patient to inform of lab results per Dr. Ravin Bennett and informed letter in the mail, patient verbalized understanding.

## 2021-10-25 NOTE — TELEPHONE ENCOUNTER
----- Message from Abebe Garcia sent at 10/25/2021 10:47 AM EDT -----  Subject: Results Request    QUESTIONS  Which lab or imaging result is the patient calling about? Blood work   Which provider ordered the test? Jany Muniz   At what location was the test performed? Date the test was performed? Additional Information for Provider? Pt is requesting the results of her   bloodwork from her provider. ---------------------------------------------------------------------------  --------------  Janna Grass INFO  What is the best way for the office to contact you? OK to leave message on   voicemail  Preferred Call Back Phone Number?  [phone]

## 2021-11-04 NOTE — TELEPHONE ENCOUNTER
----- Message from Sandeep White sent at 11/4/2021  4:33 PM EDT -----  Subject: Message to Provider    QUESTIONS  Information for Provider? Patient has not received lab work results sent   out 10-12. Can another copy be sent?   ---------------------------------------------------------------------------  --------------  CALL BACK INFO  What is the best way for the office to contact you? OK to leave message on   voicemail  Preferred Call Back Phone Number? [phone]  ---------------------------------------------------------------------------  --------------  SCRIPT ANSWERS  Relationship to Patient?  Self

## 2021-12-16 DIAGNOSIS — I10 ESSENTIAL HYPERTENSION: ICD-10-CM

## 2021-12-20 RX ORDER — LOSARTAN POTASSIUM AND HYDROCHLOROTHIAZIDE 12.5; 1 MG/1; MG/1
TABLET ORAL
Qty: 90 TABLET | Refills: 0 | Status: SHIPPED | OUTPATIENT
Start: 2021-12-20 | End: 2022-03-04 | Stop reason: SDUPTHER

## 2022-03-19 PROBLEM — E78.5 DYSLIPIDEMIA: Status: ACTIVE | Noted: 2018-04-10

## 2022-03-20 PROBLEM — Z72.0 TOBACCO ABUSE: Status: ACTIVE | Noted: 2021-03-02

## 2022-07-21 NOTE — PROGRESS NOTES
Subjective:   Ilir Pena is a 56 y.o. female with hypertension. Current Outpatient Prescriptions   Medication Sig Dispense Refill    lisinopril-hydroCHLOROthiazide (PRINZIDE, ZESTORETIC) 20-25 mg per tablet Take 1 Tab by mouth daily. 90 Tab 3      Hypertension ROS: taking medications as instructed, no medication side effects noted, home BP monitoring in range of 320'R systolic over 26'W diastolic, no TIA's, no chest pain on exertion, no dyspnea on exertion, no swelling of ankles. New concerns: less stress. Happy good job  Still smokes worried   Weight gain    Wt Readings from Last 3 Encounters:   04/10/18 154 lb (69.9 kg)   02/28/17 157 lb (71.2 kg)   01/12/16 154 lb (69.9 kg)     Social History   Substance Use Topics    Smoking status: Current Every Day Smoker     Packs/day: 0.30     Years: 8.00     Types: Cigarettes    Smokeless tobacco: Never Used    Alcohol use No     Vitals:    04/10/18 0833 04/10/18 0853   BP: 148/74 152/86   Pulse: 74    Resp: 16    SpO2: 98%    Weight: 154 lb (69.9 kg)    Height: 5' 5\" (1.651 m)      Vitals:    04/10/18 9597 04/10/18 0853   BP: 148/74 152/86   Pulse: 74    Resp: 16    SpO2: 98%    Weight: 154 lb (69.9 kg)    Height: 5' 5\" (1.651 m)          Objective:   Visit Vitals    /86    Pulse 74    Resp 16    Ht 5' 5\" (1.651 m)    Wt 154 lb (69.9 kg)    SpO2 98%    BMI 25.63 kg/m2      Appearance alert, well appearing, and in no distress and oriented to person, place, and time. General exam BP noted to be  Borderline controlled today in office, S1, S2 normal, no gallop, no murmur, chest clear, no JVD, no HSM, no edema. Lab review: no lab studies available for review at time of visit.    Lab Results   Component Value Date/Time    Cholesterol, total 219 (H) 03/02/2017 04:31 PM    HDL Cholesterol 48 03/02/2017 04:31 PM    LDL, calculated 123 (H) 03/02/2017 04:31 PM    Triglyceride 238 (H) 03/02/2017 04:31 PM    CHOL/HDL Ratio 3.0 07/01/2010 09:55 AM     Lab Xanax        Last Written Prescription Date: 11/08/18   Last Fill Quantity: 90,   # refills: 0  Last Office Visit: 11/08/18  Future Office visit:       Routing refill request to provider for review/approval because:  Drug not on the [facility], [facility] or [facility] refill protocol or controlled substance    Zulma Wilson MA       Results   Component Value Date/Time    GFR est  03/02/2017 04:31 PM    GFR est non- 03/02/2017 04:31 PM    Creatinine 0.63 03/02/2017 04:31 PM    BUN 10 03/02/2017 04:31 PM    Sodium 139 03/02/2017 04:31 PM    Potassium 3.5 03/02/2017 04:31 PM    Chloride 97 03/02/2017 04:31 PM    CO2 27 03/02/2017 04:31 PM      Wt Readings from Last 3 Encounters:   04/10/18 154 lb (69.9 kg)   02/28/17 157 lb (71.2 kg)   01/12/16 154 lb (69.9 kg)       Assessment:    Hypertension borderline controlled, stable. Will change to lisinopril / hctz    Plan:   orders and follow up as documented in patient record  the following changes are made - lower the dose appropriate. Diagnoses and all orders for this visit:    1. Essential hypertension  -     lisinopril-hydroCHLOROthiazide (PRINZIDE, ZESTORETIC) 20-25 mg per tablet; Take 1 Tab by mouth daily.  -     METABOLIC PANEL, COMPREHENSIVE  -     LIPID PANEL    2. Dyslipidemia      The patient was counseled on the dangers of tobacco use, and was advised to quit. Reviewed strategies to maximize success, including removing cigarettes and smoking materials from environment and stress management. The patient was counseled on the dangers of tobacco use, and was advised to quit. Reviewed strategies to maximize success, including removing cigarettes and smoking materials from environment, substitution of other forms of reinforcement and support of family/friends. Complex Repair And Graft Additional Text (Will Appearing After The Standard Complex Repair Text): The complex repair was not sufficient to completely close the primary defect. The remaining additional defect was repaired with the graft mentioned below.

## 2022-08-29 DIAGNOSIS — I10 ESSENTIAL HYPERTENSION: ICD-10-CM

## 2022-08-29 RX ORDER — LOSARTAN POTASSIUM AND HYDROCHLOROTHIAZIDE 12.5; 1 MG/1; MG/1
1 TABLET ORAL DAILY
Qty: 90 TABLET | Refills: 1 | Status: SHIPPED | OUTPATIENT
Start: 2022-08-29 | End: 2022-09-26 | Stop reason: SDUPTHER

## 2022-09-26 ENCOUNTER — OFFICE VISIT (OUTPATIENT)
Dept: INTERNAL MEDICINE CLINIC | Age: 62
End: 2022-09-26
Payer: COMMERCIAL

## 2022-09-26 VITALS
DIASTOLIC BLOOD PRESSURE: 73 MMHG | BODY MASS INDEX: 29.42 KG/M2 | HEART RATE: 81 BPM | SYSTOLIC BLOOD PRESSURE: 126 MMHG | OXYGEN SATURATION: 100 % | WEIGHT: 176.6 LBS | TEMPERATURE: 97.4 F | HEIGHT: 65 IN | RESPIRATION RATE: 16 BRPM

## 2022-09-26 DIAGNOSIS — Z11.59 ENCOUNTER FOR HEPATITIS C SCREENING TEST FOR LOW RISK PATIENT: ICD-10-CM

## 2022-09-26 DIAGNOSIS — I10 ESSENTIAL HYPERTENSION: Primary | ICD-10-CM

## 2022-09-26 DIAGNOSIS — I10 ESSENTIAL HYPERTENSION: ICD-10-CM

## 2022-09-26 PROCEDURE — 99213 OFFICE O/P EST LOW 20 MIN: CPT | Performed by: INTERNAL MEDICINE

## 2022-09-26 RX ORDER — LOSARTAN POTASSIUM AND HYDROCHLOROTHIAZIDE 12.5; 1 MG/1; MG/1
1 TABLET ORAL DAILY
Qty: 90 TABLET | Refills: 3 | Status: SHIPPED | OUTPATIENT
Start: 2022-09-26

## 2022-09-26 NOTE — PROGRESS NOTES
Subjective:   Lillian Mccollum is a 63 y.o. female with hypertension. Current Outpatient Medications   Medication Sig Dispense Refill    losartan-hydroCHLOROthiazide (HYZAAR) 100-12.5 mg per tablet Take 1 Tablet by mouth daily. 90 Tablet 3      Hypertension ROS: taking medications as instructed, no medication side effects noted, home BP monitoring in range of 481'C systolic over 84'O diastolic, no TIA's, no chest pain on exertion, no dyspnea on exertion, no swelling of ankles. New concerns: less stress. Happy good job  Still smokes worried       Wt Readings from Last 3 Encounters:   09/26/22 176 lb 9.6 oz (80.1 kg)   09/22/21 169 lb 12.8 oz (77 kg)   09/01/21 169 lb (76.7 kg)     Social History     Tobacco Use    Smoking status: Every Day     Packs/day: 0.30     Years: 8.00     Pack years: 2.40     Types: Cigarettes    Smokeless tobacco: Never   Vaping Use    Vaping Use: Never used   Substance Use Topics    Alcohol use: No    Drug use: No     Vitals:    09/26/22 1436 09/26/22 1456   BP: 131/74 126/73   Pulse: 81    Resp: 16    Temp: 97.4 °F (36.3 °C)    TempSrc: Temporal    SpO2: 100%    Weight: 176 lb 9.6 oz (80.1 kg)    Height: 5' 5\" (1.651 m)      Vitals:    09/26/22 1436 09/26/22 1456   BP: 131/74 126/73   Pulse: 81    Resp: 16    Temp: 97.4 °F (36.3 °C)    TempSrc: Temporal    SpO2: 100%    Weight: 176 lb 9.6 oz (80.1 kg)    Height: 5' 5\" (1.651 m)      Wt Readings from Last 3 Encounters:   09/26/22 176 lb 9.6 oz (80.1 kg)   09/22/21 169 lb 12.8 oz (77 kg)   09/01/21 169 lb (76.7 kg)         Objective:   Visit Vitals  /73   Pulse 81   Temp 97.4 °F (36.3 °C) (Temporal)   Resp 16   Ht 5' 5\" (1.651 m)   Wt 176 lb 9.6 oz (80.1 kg)   SpO2 100%   BMI 29.39 kg/m²      Appearance alert, well appearing, and in no distress and oriented to person, place, and time. General exam BP noted to be  Borderline controlled today in office, S1, S2 normal, no gallop, no murmur, chest clear, no JVD, no HSM, no edema.    Lab review: no lab studies available for review at time of visit. Lab Results   Component Value Date/Time    Cholesterol, total 193 10/06/2021 08:27 AM    HDL Cholesterol 41 10/06/2021 08:27 AM    LDL, calculated 123 (H) 10/06/2021 08:27 AM    LDL, calculated 123 (H) 04/10/2018 09:14 AM    Triglyceride 164 (H) 10/06/2021 08:27 AM    CHOL/HDL Ratio 3.0 07/01/2010 09:55 AM     Lab Results   Component Value Date/Time    GFR est AA 95 10/06/2021 08:27 AM    GFR est non-AA 82 10/06/2021 08:27 AM    Creatinine 0.79 10/06/2021 08:27 AM    BUN 13 10/06/2021 08:27 AM    Sodium 144 10/06/2021 08:27 AM    Potassium 4.4 10/06/2021 08:27 AM    Chloride 103 10/06/2021 08:27 AM    CO2 28 10/06/2021 08:27 AM      Wt Readings from Last 3 Encounters:   09/26/22 176 lb 9.6 oz (80.1 kg)   09/22/21 169 lb 12.8 oz (77 kg)   09/01/21 169 lb (76.7 kg)       Assessment:    Hypertension not controlled, stable. Weight loss   Plan:   Add   Losartan . The patient was counseled on the dangers of tobacco use, and was advised to quit. Reviewed strategies to maximize success, including removing cigarettes and smoking materials from environment and stress management. The patient was counseled on the dangers of tobacco use, and was advised to quit. Reviewed strategies to maximize success, including removing cigarettes and smoking materials from environment, substitution of other forms of reinforcement and support of family/friends. Offered lung cancer screen  Yearly flu vaccine  Offered pneumovax  covid vaccinations reviewed and priority reviewed and my advice to get vaccinated reviewed    Diagnoses and all orders for this visit:    1. Essential hypertension  -     CBC WITH AUTOMATED DIFF; Future  -     LIPID PANEL; Future  -     METABOLIC PANEL, COMPREHENSIVE; Future  -     losartan-hydroCHLOROthiazide (HYZAAR) 100-12.5 mg per tablet; Take 1 Tablet by mouth daily.     2. Encounter for hepatitis C screening test for low risk patient  - HEPATITIS C AB; Future    Advise exercise   Stop smoking      I have recommended that this patient have a mammogram, pap smear, immunization for anything, pelvic exam, and colonoscopy but she declines at this time. I have discussed the risks and benefits of this examination with her. The patient verbalizes understanding.   covid vaccinations reviewed and priority reviewed and my advice to get vaccinated reviewed  Home tests advised

## 2022-09-26 NOTE — PROGRESS NOTES
Rm    Chief Complaint   Patient presents with    Blood Pressure Check        Visit Vitals  /74 (BP 1 Location: Left upper arm, BP Patient Position: Sitting, BP Cuff Size: Adult)   Pulse 81   Temp 97.4 °F (36.3 °C) (Temporal)   Resp 16   Ht 5' 5\" (1.651 m)   Wt 176 lb 9.6 oz (80.1 kg)   SpO2 100%   BMI 29.39 kg/m²        1. Have you been to the ER, urgent care clinic since your last visit? Hospitalized since your last visit? No    2. Have you seen or consulted any other health care providers outside of the [address] [place] [address] since your last visit? Include any pap smears or colon screening. No     Health Maintenance Due   Topic Date Due    Hepatitis C Screening  Never done    COVID-19 Vaccine (1) Never done    Pneumococcal 0-64 years (1 - PCV) Never done    DTaP/Tdap/Td series (1 - Tdap) Never done    Cervical cancer screen  Never done    Colorectal Cancer Screening Combo  Never done    Shingrix Vaccine Age 50> (1 of 2) Never done    Breast Cancer Screen Mammogram  Never done    Flu Vaccine (1) Never done    Depression Screen  09/22/2022        3 most recent PHQ Screens 9/26/2022   Little interest or pleasure in doing things Not at all   Feeling down, depressed, irritable, or hopeless Not at all   Total Score PHQ 2 0        No flowsheet data found.     Learning Assessment 9/22/2021   PRIMARY LEARNER Patient   HIGHEST LEVEL OF EDUCATION - PRIMARY LEARNER  GRADUATED HIGH SCHOOL OR GED   BARRIERS PRIMARY LEARNER NONE   CO-LEARNER CAREGIVER No   PRIMARY LANGUAGE ENGLISH   LEARNER PREFERENCE PRIMARY LISTENING     -   ANSWERED BY patient   RELATIONSHIP SELF

## 2022-09-28 LAB
ALBUMIN SERPL-MCNC: 4.5 G/DL (ref 3.8–4.9)
ALBUMIN/GLOB SERPL: 1.9 {RATIO} (ref 1.2–2.2)
ALP SERPL-CCNC: 85 IU/L (ref 44–121)
ALT SERPL-CCNC: 17 IU/L (ref 0–32)
AST SERPL-CCNC: 23 IU/L (ref 0–40)
BASOPHILS # BLD AUTO: 0 X10E3/UL (ref 0–0.2)
BASOPHILS NFR BLD AUTO: 0 %
BILIRUB SERPL-MCNC: <0.2 MG/DL (ref 0–1.2)
BUN SERPL-MCNC: 11 MG/DL (ref 8–27)
BUN/CREAT SERPL: 15 (ref 12–28)
CALCIUM SERPL-MCNC: 9.8 MG/DL (ref 8.7–10.3)
CHLORIDE SERPL-SCNC: 100 MMOL/L (ref 96–106)
CHOLEST SERPL-MCNC: 227 MG/DL (ref 100–199)
CO2 SERPL-SCNC: 22 MMOL/L (ref 20–29)
CREAT SERPL-MCNC: 0.75 MG/DL (ref 0.57–1)
EGFR: 91 ML/MIN/1.73
EOSINOPHIL # BLD AUTO: 0.1 X10E3/UL (ref 0–0.4)
EOSINOPHIL NFR BLD AUTO: 1 %
ERYTHROCYTE [DISTWIDTH] IN BLOOD BY AUTOMATED COUNT: 13 % (ref 11.7–15.4)
GLOBULIN SER CALC-MCNC: 2.4 G/DL (ref 1.5–4.5)
GLUCOSE SERPL-MCNC: 89 MG/DL (ref 70–99)
HCT VFR BLD AUTO: 39.6 % (ref 34–46.6)
HCV AB S/CO SERPL IA: <0.1 S/CO RATIO (ref 0–0.9)
HDLC SERPL-MCNC: 41 MG/DL
HGB BLD-MCNC: 13 G/DL (ref 11.1–15.9)
IMM GRANULOCYTES # BLD AUTO: 0 X10E3/UL (ref 0–0.1)
IMM GRANULOCYTES NFR BLD AUTO: 0 %
IMP & REVIEW OF LAB RESULTS: NORMAL
LDLC SERPL CALC-MCNC: 124 MG/DL (ref 0–99)
LYMPHOCYTES # BLD AUTO: 3.3 X10E3/UL (ref 0.7–3.1)
LYMPHOCYTES NFR BLD AUTO: 33 %
MCH RBC QN AUTO: 30.2 PG (ref 26.6–33)
MCHC RBC AUTO-ENTMCNC: 32.8 G/DL (ref 31.5–35.7)
MCV RBC AUTO: 92 FL (ref 79–97)
MONOCYTES # BLD AUTO: 0.5 X10E3/UL (ref 0.1–0.9)
MONOCYTES NFR BLD AUTO: 5 %
NEUTROPHILS # BLD AUTO: 6.1 X10E3/UL (ref 1.4–7)
NEUTROPHILS NFR BLD AUTO: 61 %
PLATELET # BLD AUTO: 277 X10E3/UL (ref 150–450)
POTASSIUM SERPL-SCNC: 4.4 MMOL/L (ref 3.5–5.2)
PROT SERPL-MCNC: 6.9 G/DL (ref 6–8.5)
RBC # BLD AUTO: 4.31 X10E6/UL (ref 3.77–5.28)
SODIUM SERPL-SCNC: 138 MMOL/L (ref 134–144)
TRIGL SERPL-MCNC: 348 MG/DL (ref 0–149)
VLDLC SERPL CALC-MCNC: 62 MG/DL (ref 5–40)
WBC # BLD AUTO: 10.1 X10E3/UL (ref 3.4–10.8)

## 2022-10-04 ENCOUNTER — TELEPHONE (OUTPATIENT)
Dept: INTERNAL MEDICINE CLINIC | Age: 62
End: 2022-10-04

## 2022-10-04 DIAGNOSIS — E78.5 DYSLIPIDEMIA: Primary | ICD-10-CM

## 2022-10-11 RX ORDER — ATORVASTATIN CALCIUM 10 MG/1
10 TABLET, FILM COATED ORAL DAILY
Qty: 90 TABLET | Refills: 3 | Status: SHIPPED | OUTPATIENT
Start: 2022-10-11

## 2022-10-11 NOTE — TELEPHONE ENCOUNTER
----- Message from Dane Tapia sent at 10/7/2022 12:40 PM EDT -----  Subject: Message to Provider    QUESTIONS  Information for Provider? Pt would like her pcp to call her. Pt has   questions regarding her test results. ---------------------------------------------------------------------------  --------------  Archie SALINAS  [phone]; Do not leave any message, patient will call back for answer  ---------------------------------------------------------------------------  --------------  SCRIPT ANSWERS  Relationship to Patient?  Self

## 2022-10-11 NOTE — TELEPHONE ENCOUNTER
Pt would like her pcp to call her. Pt has questions regarding her test results. CALL BACK INFO   [phone]; Do not leave any message    Patient wants to speak with Dr. Jeferson Ramirez.

## 2023-09-27 DIAGNOSIS — E78.5 HYPERLIPIDEMIA, UNSPECIFIED: ICD-10-CM

## 2023-09-27 RX ORDER — ATORVASTATIN CALCIUM 10 MG/1
10 TABLET, FILM COATED ORAL DAILY
Qty: 90 TABLET | Refills: 3 | OUTPATIENT
Start: 2023-09-27

## 2023-09-27 RX ORDER — ATORVASTATIN CALCIUM 10 MG/1
10 TABLET, FILM COATED ORAL DAILY
Qty: 30 TABLET | Refills: 1 | Status: SHIPPED | OUTPATIENT
Start: 2023-09-27

## 2023-09-27 RX ORDER — LOSARTAN POTASSIUM AND HYDROCHLOROTHIAZIDE 12.5; 1 MG/1; MG/1
1 TABLET ORAL DAILY
Qty: 30 TABLET | Refills: 1 | Status: SHIPPED | OUTPATIENT
Start: 2023-09-27

## 2023-09-27 NOTE — TELEPHONE ENCOUNTER
----- Message from [facility] sent at 9/27/2023  9:39 AM EDT -----  Subject: Refill Request    QUESTIONS  Name of Medication? atorvastatin (LIPITOR) 10 MG tablet  Patient-reported dosage and instructions? 1 times daily   How many days do you have left? 3  Preferred Pharmacy? [facility]ACY #[id number]  Pharmacy phone number (if available)? [phone]  Additional Information for Provider? Pt wants a 90 day supply for both   ---------------------------------------------------------------------------  --------------,  Name of Medication? losartan-hydroCHLOROthiazide (HYZAAR) 100-12.5 MG per   tablet  Patient-reported dosage and instructions? 1 times daily   How many days do you have left? 3  Preferred Pharmacy? [facility]/PHARMACY #[id number]  Pharmacy phone number (if available)? [phone]  Additional Information for Provider? 90 days supply for this  ---------------------------------------------------------------------------  --------------  CALL BACK INFO  What is the best way for the office to contact you? OK to leave message on   voicemail  Preferred Call Back Phone Number? [phone]  ---------------------------------------------------------------------------  --------------  SCRIPT ANSWERS  Relationship to Patient?  Self

## 2023-11-02 RX ORDER — LOSARTAN POTASSIUM AND HYDROCHLOROTHIAZIDE 12.5; 1 MG/1; MG/1
1 TABLET ORAL DAILY
Qty: 90 TABLET | Refills: 1 | Status: SHIPPED | OUTPATIENT
Start: 2023-11-02

## 2023-11-02 RX ORDER — ATORVASTATIN CALCIUM 10 MG/1
10 TABLET, FILM COATED ORAL DAILY
Qty: 90 TABLET | Refills: 1 | Status: SHIPPED | OUTPATIENT
Start: 2023-11-02

## 2023-11-02 NOTE — TELEPHONE ENCOUNTER
Chief Complaint   Patient presents with    Medication Refill       Requested Prescriptions     Pending Prescriptions Disp Refills    atorvastatin (LIPITOR) 10 MG tablet 30 tablet 1     Sig: Take 1 tablet by mouth daily       Allergies:   Allergies   Allergen Reactions    Codeine Other (See Comments)     hallucination    Sulfa Antibiotics Nausea Only       Last visit with clinic:  9/26/2022   Next visit with clinic: 11/9/2023     Last visit with this provider: 9/26/2022   Next Visit with this provider: 11/9/2023    Signed by Cali PONCE  11/02/23  11:17 AM

## 2023-11-09 ENCOUNTER — OFFICE VISIT (OUTPATIENT)
Age: 63
End: 2023-11-09

## 2023-11-09 VITALS
OXYGEN SATURATION: 98 % | SYSTOLIC BLOOD PRESSURE: 136 MMHG | HEIGHT: 65 IN | RESPIRATION RATE: 18 BRPM | TEMPERATURE: 98.2 F | DIASTOLIC BLOOD PRESSURE: 79 MMHG | WEIGHT: 170.4 LBS | HEART RATE: 87 BPM | BODY MASS INDEX: 28.39 KG/M2

## 2023-11-09 DIAGNOSIS — E78.2 MIXED HYPERLIPIDEMIA: Primary | ICD-10-CM

## 2023-11-09 DIAGNOSIS — Z72.0 TOBACCO ABUSE: ICD-10-CM

## 2023-11-09 DIAGNOSIS — E78.2 MIXED HYPERLIPIDEMIA: ICD-10-CM

## 2023-11-09 DIAGNOSIS — I10 ESSENTIAL (PRIMARY) HYPERTENSION: ICD-10-CM

## 2023-11-09 LAB
ALBUMIN SERPL-MCNC: 3.9 G/DL (ref 3.5–5)
ALBUMIN/GLOB SERPL: 1.2 (ref 1.1–2.2)
ALP SERPL-CCNC: 93 U/L (ref 45–117)
ALT SERPL-CCNC: 20 U/L (ref 12–78)
ANION GAP SERPL CALC-SCNC: 5 MMOL/L (ref 5–15)
AST SERPL-CCNC: 15 U/L (ref 15–37)
BASOPHILS # BLD: 0 K/UL (ref 0–0.1)
BASOPHILS NFR BLD: 0 % (ref 0–1)
BILIRUB SERPL-MCNC: 0.6 MG/DL (ref 0.2–1)
BUN SERPL-MCNC: 9 MG/DL (ref 6–20)
BUN/CREAT SERPL: 13 (ref 12–20)
CALCIUM SERPL-MCNC: 9 MG/DL (ref 8.5–10.1)
CHLORIDE SERPL-SCNC: 104 MMOL/L (ref 97–108)
CHOLEST SERPL-MCNC: 128 MG/DL
CO2 SERPL-SCNC: 29 MMOL/L (ref 21–32)
CREAT SERPL-MCNC: 0.72 MG/DL (ref 0.55–1.02)
DIFFERENTIAL METHOD BLD: NORMAL
EOSINOPHIL # BLD: 0.1 K/UL (ref 0–0.4)
EOSINOPHIL NFR BLD: 1 % (ref 0–7)
ERYTHROCYTE [DISTWIDTH] IN BLOOD BY AUTOMATED COUNT: 13.5 % (ref 11.5–14.5)
GLOBULIN SER CALC-MCNC: 3.3 G/DL (ref 2–4)
GLUCOSE SERPL-MCNC: 110 MG/DL (ref 65–100)
HCT VFR BLD AUTO: 40 % (ref 35–47)
HDLC SERPL-MCNC: 48 MG/DL
HDLC SERPL: 2.7 (ref 0–5)
HGB BLD-MCNC: 12.9 G/DL (ref 11.5–16)
IMM GRANULOCYTES # BLD AUTO: 0 K/UL (ref 0–0.04)
IMM GRANULOCYTES NFR BLD AUTO: 0 % (ref 0–0.5)
LDLC SERPL CALC-MCNC: 52.4 MG/DL (ref 0–100)
LYMPHOCYTES # BLD: 2.1 K/UL (ref 0.8–3.5)
LYMPHOCYTES NFR BLD: 24 % (ref 12–49)
MCH RBC QN AUTO: 28.9 PG (ref 26–34)
MCHC RBC AUTO-ENTMCNC: 32.3 G/DL (ref 30–36.5)
MCV RBC AUTO: 89.5 FL (ref 80–99)
MONOCYTES # BLD: 0.6 K/UL (ref 0–1)
MONOCYTES NFR BLD: 6 % (ref 5–13)
NEUTS SEG # BLD: 6 K/UL (ref 1.8–8)
NEUTS SEG NFR BLD: 69 % (ref 32–75)
NRBC # BLD: 0 K/UL (ref 0–0.01)
NRBC BLD-RTO: 0 PER 100 WBC
PLATELET # BLD AUTO: 266 K/UL (ref 150–400)
PMV BLD AUTO: 11.5 FL (ref 8.9–12.9)
POTASSIUM SERPL-SCNC: 4 MMOL/L (ref 3.5–5.1)
PROT SERPL-MCNC: 7.2 G/DL (ref 6.4–8.2)
RBC # BLD AUTO: 4.47 M/UL (ref 3.8–5.2)
SODIUM SERPL-SCNC: 138 MMOL/L (ref 136–145)
TRIGL SERPL-MCNC: 138 MG/DL
VLDLC SERPL CALC-MCNC: 27.6 MG/DL
WBC # BLD AUTO: 8.7 K/UL (ref 3.6–11)

## 2023-11-09 PROCEDURE — 99213 OFFICE O/P EST LOW 20 MIN: CPT | Performed by: INTERNAL MEDICINE

## 2023-11-09 PROCEDURE — 3075F SYST BP GE 130 - 139MM HG: CPT | Performed by: INTERNAL MEDICINE

## 2023-11-09 PROCEDURE — 3078F DIAST BP <80 MM HG: CPT | Performed by: INTERNAL MEDICINE

## 2023-11-09 RX ORDER — ATORVASTATIN CALCIUM 10 MG/1
10 TABLET, FILM COATED ORAL DAILY
Qty: 90 TABLET | Refills: 1 | Status: CANCELLED | OUTPATIENT
Start: 2023-11-09 | End: 2024-02-07

## 2023-11-09 RX ORDER — LOSARTAN POTASSIUM AND HYDROCHLOROTHIAZIDE 12.5; 1 MG/1; MG/1
1 TABLET ORAL DAILY
Qty: 90 TABLET | Refills: 1 | Status: CANCELLED | OUTPATIENT
Start: 2023-11-09 | End: 2024-02-07

## 2023-11-09 RX ORDER — LOSARTAN POTASSIUM AND HYDROCHLOROTHIAZIDE 12.5; 1 MG/1; MG/1
1 TABLET ORAL DAILY
Qty: 90 TABLET | Refills: 3 | Status: SHIPPED | OUTPATIENT
Start: 2023-11-09

## 2023-11-09 RX ORDER — ATORVASTATIN CALCIUM 10 MG/1
10 TABLET, FILM COATED ORAL DAILY
Qty: 90 TABLET | Refills: 3 | Status: SHIPPED | OUTPATIENT
Start: 2023-11-09

## 2023-11-09 SDOH — ECONOMIC STABILITY: FOOD INSECURITY: WITHIN THE PAST 12 MONTHS, YOU WORRIED THAT YOUR FOOD WOULD RUN OUT BEFORE YOU GOT MONEY TO BUY MORE.: NEVER TRUE

## 2023-11-09 SDOH — ECONOMIC STABILITY: HOUSING INSECURITY
IN THE LAST 12 MONTHS, WAS THERE A TIME WHEN YOU DID NOT HAVE A STEADY PLACE TO SLEEP OR SLEPT IN A SHELTER (INCLUDING NOW)?: NO

## 2023-11-09 SDOH — ECONOMIC STABILITY: FOOD INSECURITY: WITHIN THE PAST 12 MONTHS, THE FOOD YOU BOUGHT JUST DIDN'T LAST AND YOU DIDN'T HAVE MONEY TO GET MORE.: NEVER TRUE

## 2023-11-09 SDOH — ECONOMIC STABILITY: INCOME INSECURITY: HOW HARD IS IT FOR YOU TO PAY FOR THE VERY BASICS LIKE FOOD, HOUSING, MEDICAL CARE, AND HEATING?: NOT VERY HARD

## 2023-11-09 ASSESSMENT — PATIENT HEALTH QUESTIONNAIRE - PHQ9
SUM OF ALL RESPONSES TO PHQ9 QUESTIONS 1 & 2: 0
SUM OF ALL RESPONSES TO PHQ QUESTIONS 1-9: 0
1. LITTLE INTEREST OR PLEASURE IN DOING THINGS: 0
2. FEELING DOWN, DEPRESSED OR HOPELESS: 0

## 2023-12-06 ENCOUNTER — TELEPHONE (OUTPATIENT)
Age: 63
End: 2023-12-06

## 2024-07-26 RX ORDER — LOSARTAN POTASSIUM AND HYDROCHLOROTHIAZIDE 12.5; 1 MG/1; MG/1
1 TABLET ORAL DAILY
Qty: 90 TABLET | Refills: 1 | Status: SHIPPED | OUTPATIENT
Start: 2024-07-26

## 2024-07-26 NOTE — TELEPHONE ENCOUNTER
Called and spoke with pt.  Verified identity x2.    Pt states she should not be out of losartan and is months short  Chart shows a one year Rx filled 11/09/23  Atorvastatin also filled then, and pt is not out'    Pt would like Rx long enough to take her to 11/24, so both her meds can be filled at the same time    Pt also verifies [facility] pharmacy        Refill request received from [facility]    for   Requested Prescriptions     Pending Prescriptions Disp Refills    losartan-hydroCHLOROthiazide (HYZAAR) 100-12.5 MG per tablet 90 tablet 3     Sig: Take 1 tablet by mouth daily     Last office visit: 11/9/2023   Next office visit: Visit date not found     Routed to CURLY Jackson for review.     Shiloh Hudson LPN

## 2024-07-26 NOTE — TELEPHONE ENCOUNTER
Patient requesting refill of losartan-hydroCHLOROthiazide (HYZAAR) 100-12.5 MG per tablet to be sent to the [facility] on file. She states she has enough medication to make it through the weekend, but will be out by next week. I informed her that her provider is only in clinic on Tuesdays and Thursdays and refill requests can take up to 72 hours. She wanted to know if another provider would be able to fill the medication for her. I informed her that I would ask, but even still we have to allow up to 72 hours. I also told her that I could not ensure that another provider would refill the medication. She understood and said she would contact her pharmacy and ask if they could give her a short supply of the medication until her provider returns next week.

## 2024-07-30 ENCOUNTER — TELEPHONE (OUTPATIENT)
Age: 64
End: 2024-07-30

## 2024-07-30 NOTE — TELEPHONE ENCOUNTER
Spoke with patient regarding appointment request. She states that she does not need to be seen soon as she just needs a follow up for medication refills. Patient confirmed that she has enough medication/refills to last through November. She is scheduled for a medication f/u on 11/12/2024.

## 2024-07-30 NOTE — TELEPHONE ENCOUNTER
----- Message from Gabe Kee sent at 7/26/2024 10:14 AM EDT -----  Regarding: [facility] Appointment Request  [facility] Appointment Request    Patient needs appointment for [facility] Appointment Type: Existing Condition Follow Up.    Patient Requested Dates(s):  Patient Requested Time: latest afternoon schedule  Provider Name: Cuco Eubanks MD    Reason for Appointment Request: Established Patient - No appointments available during search: Medication refill (blood pressure and cholesterol)  --------------------------------------------------------------------------------------------------------------------------    Relationship to Patient: Self     Call Back Information: Do not leave any message, patient will call back for answer  Preferred Call Back Number: Phone [phone]

## 2024-11-12 ENCOUNTER — OFFICE VISIT (OUTPATIENT)
Age: 64
End: 2024-11-12
Payer: COMMERCIAL

## 2024-11-12 VITALS
HEART RATE: 76 BPM | WEIGHT: 169 LBS | SYSTOLIC BLOOD PRESSURE: 150 MMHG | DIASTOLIC BLOOD PRESSURE: 70 MMHG | OXYGEN SATURATION: 98 % | BODY MASS INDEX: 28.85 KG/M2 | HEIGHT: 64 IN | TEMPERATURE: 97.4 F | RESPIRATION RATE: 17 BRPM

## 2024-11-12 DIAGNOSIS — I10 ESSENTIAL (PRIMARY) HYPERTENSION: ICD-10-CM

## 2024-11-12 DIAGNOSIS — E78.2 MIXED HYPERLIPIDEMIA: ICD-10-CM

## 2024-11-12 DIAGNOSIS — Z72.0 TOBACCO ABUSE: ICD-10-CM

## 2024-11-12 DIAGNOSIS — I10 ESSENTIAL (PRIMARY) HYPERTENSION: Primary | ICD-10-CM

## 2024-11-12 DIAGNOSIS — Z71.85 VACCINE COUNSELING: ICD-10-CM

## 2024-11-12 PROCEDURE — 99213 OFFICE O/P EST LOW 20 MIN: CPT | Performed by: INTERNAL MEDICINE

## 2024-11-12 PROCEDURE — 3077F SYST BP >= 140 MM HG: CPT | Performed by: INTERNAL MEDICINE

## 2024-11-12 PROCEDURE — 3078F DIAST BP <80 MM HG: CPT | Performed by: INTERNAL MEDICINE

## 2024-11-12 RX ORDER — ATORVASTATIN CALCIUM 10 MG/1
10 TABLET, FILM COATED ORAL DAILY
Qty: 90 TABLET | Refills: 3 | Status: SHIPPED | OUTPATIENT
Start: 2024-11-12

## 2024-11-12 RX ORDER — LOSARTAN POTASSIUM AND HYDROCHLOROTHIAZIDE 12.5; 1 MG/1; MG/1
1 TABLET ORAL DAILY
Qty: 90 TABLET | Refills: 3 | Status: SHIPPED | OUTPATIENT
Start: 2024-11-12

## 2024-11-12 SDOH — ECONOMIC STABILITY: FOOD INSECURITY: WITHIN THE PAST 12 MONTHS, YOU WORRIED THAT YOUR FOOD WOULD RUN OUT BEFORE YOU GOT MONEY TO BUY MORE.: NEVER TRUE

## 2024-11-12 SDOH — ECONOMIC STABILITY: INCOME INSECURITY: HOW HARD IS IT FOR YOU TO PAY FOR THE VERY BASICS LIKE FOOD, HOUSING, MEDICAL CARE, AND HEATING?: SOMEWHAT HARD

## 2024-11-12 SDOH — ECONOMIC STABILITY: FOOD INSECURITY: WITHIN THE PAST 12 MONTHS, THE FOOD YOU BOUGHT JUST DIDN'T LAST AND YOU DIDN'T HAVE MONEY TO GET MORE.: NEVER TRUE

## 2024-11-12 SDOH — HEALTH STABILITY: PHYSICAL HEALTH: ON AVERAGE, HOW MANY DAYS PER WEEK DO YOU ENGAGE IN MODERATE TO STRENUOUS EXERCISE (LIKE A BRISK WALK)?: 5 DAYS

## 2024-11-12 SDOH — HEALTH STABILITY: PHYSICAL HEALTH: ON AVERAGE, HOW MANY MINUTES DO YOU ENGAGE IN EXERCISE AT THIS LEVEL?: 40 MIN

## 2024-11-12 ASSESSMENT — PATIENT HEALTH QUESTIONNAIRE - PHQ9
SUM OF ALL RESPONSES TO PHQ QUESTIONS 1-9: 0
2. FEELING DOWN, DEPRESSED OR HOPELESS: NOT AT ALL
SUM OF ALL RESPONSES TO PHQ QUESTIONS 1-9: 0
SUM OF ALL RESPONSES TO PHQ9 QUESTIONS 1 & 2: 0
1. LITTLE INTEREST OR PLEASURE IN DOING THINGS: NOT AT ALL

## 2024-11-13 LAB
ALBUMIN SERPL-MCNC: 4.4 G/DL (ref 3.9–4.9)
ALP SERPL-CCNC: 98 IU/L (ref 44–121)
ALT SERPL-CCNC: 12 IU/L (ref 0–32)
AST SERPL-CCNC: 16 IU/L (ref 0–40)
BASOPHILS # BLD AUTO: 0 X10E3/UL (ref 0–0.2)
BASOPHILS NFR BLD AUTO: 0 %
BILIRUB SERPL-MCNC: <0.2 MG/DL (ref 0–1.2)
BUN SERPL-MCNC: 13 MG/DL (ref 8–27)
BUN/CREAT SERPL: 20 (ref 12–28)
CALCIUM SERPL-MCNC: 9.6 MG/DL (ref 8.7–10.3)
CHLORIDE SERPL-SCNC: 100 MMOL/L (ref 96–106)
CHOLEST SERPL-MCNC: 151 MG/DL (ref 100–199)
CO2 SERPL-SCNC: 24 MMOL/L (ref 20–29)
CREAT SERPL-MCNC: 0.65 MG/DL (ref 0.57–1)
EGFRCR SERPLBLD CKD-EPI 2021: 99 ML/MIN/1.73
EOSINOPHIL # BLD AUTO: 0.1 X10E3/UL (ref 0–0.4)
EOSINOPHIL NFR BLD AUTO: 1 %
ERYTHROCYTE [DISTWIDTH] IN BLOOD BY AUTOMATED COUNT: 12.7 % (ref 11.7–15.4)
GLOBULIN SER CALC-MCNC: 2.4 G/DL (ref 1.5–4.5)
GLUCOSE SERPL-MCNC: 104 MG/DL (ref 70–99)
HCT VFR BLD AUTO: 39.5 % (ref 34–46.6)
HDLC SERPL-MCNC: 47 MG/DL
HGB BLD-MCNC: 12.5 G/DL (ref 11.1–15.9)
IMM GRANULOCYTES # BLD AUTO: 0 X10E3/UL (ref 0–0.1)
IMM GRANULOCYTES NFR BLD AUTO: 0 %
IMP & REVIEW OF LAB RESULTS: NORMAL
LDLC SERPL CALC-MCNC: 66 MG/DL (ref 0–99)
LYMPHOCYTES # BLD AUTO: 3 X10E3/UL (ref 0.7–3.1)
LYMPHOCYTES NFR BLD AUTO: 27 %
MCH RBC QN AUTO: 29.2 PG (ref 26.6–33)
MCHC RBC AUTO-ENTMCNC: 31.6 G/DL (ref 31.5–35.7)
MCV RBC AUTO: 92 FL (ref 79–97)
MONOCYTES # BLD AUTO: 0.6 X10E3/UL (ref 0.1–0.9)
MONOCYTES NFR BLD AUTO: 6 %
NEUTROPHILS # BLD AUTO: 7.2 X10E3/UL (ref 1.4–7)
NEUTROPHILS NFR BLD AUTO: 66 %
PLATELET # BLD AUTO: 269 X10E3/UL (ref 150–450)
POTASSIUM SERPL-SCNC: 4.7 MMOL/L (ref 3.5–5.2)
PROT SERPL-MCNC: 6.8 G/DL (ref 6–8.5)
RBC # BLD AUTO: 4.28 X10E6/UL (ref 3.77–5.28)
SODIUM SERPL-SCNC: 138 MMOL/L (ref 134–144)
TRIGL SERPL-MCNC: 236 MG/DL (ref 0–149)
VLDLC SERPL CALC-MCNC: 38 MG/DL (ref 5–40)
WBC # BLD AUTO: 10.9 X10E3/UL (ref 3.4–10.8)

## 2024-11-13 NOTE — PROGRESS NOTES
I have reviewed all needed documentation in preparation for visit. Verified patient by name and date of birth  Chief Complaint   Patient presents with    1 Year Follow Up       There were no vitals filed for this visit.    Health Maintenance Due   Topic Date Due    Pneumococcal 0-64 years Vaccine (1 of 2 - PCV) Never done    HIV screen  Never done    DTaP/Tdap/Td vaccine (1 - Tdap) Never done    Cervical cancer screen  Never done    Breast cancer screen  Never done    Colorectal Cancer Screen  Never done    Shingles vaccine (1 of 2) Never done    Respiratory Syncytial Virus (RSV) Pregnant or age 60 yrs+ (1 - 1-dose 60+ series) Never done    Flu vaccine (1) Never done    COVID-19 Vaccine (1 - 2023-24 season) Never done    Lipids  11/09/2024    Depression Screen  11/09/2024     \"Have you been to the ER, urgent care clinic since your last visit?  Hospitalized since your last visit?\"    NO    “Have you seen or consulted any other health care providers outside of [facility] since your last visit?”    NO    Have you had a mammogram?”   NO    No breast cancer screening on file      “Have you had a pap smear?”    NO    No cervical cancer screening on file         “Have you had a colorectal cancer screening such as a colonoscopy/FIT/Cologuard?    NO    No colonoscopy on file  No cologuard on file  No FIT/FOBT on file   No flexible sigmoidoscopy on file         Click Here for Release of Records Request         Kandice GRACIA  
patient.  Repeat labs ordered prior to next appointment.  Orders and follow up as documented in patient record.  Reviewed diet, exercise and weight control.  Recommended sodium restriction.  Very strongly urged to quit smoking to reduce cardiovascular risk..  Discussed in detail with her that there really are no immune stimulant that would protect her against infections I suggested that vaccines are the way to go to artificially add more antibodies to your system to prevent she is much of very much a vaccine hesitant person I did tell her not to take 5000 units of vitamin D she could take thousand with calcium as a bone health medication she needs a breast exam she needs colorectal cancer screening she needs cervical cancer screening she needs to see a gynecologist and and and she needs a colonoscopy she knows this yet she is not willing to set any of these things up at the present time but in the future we will hopefully set them up      Tami was seen today for 1 year follow up.    Diagnoses and all orders for this visit:    Essential (primary) hypertension  -     CBC with Auto Differential; Future  -     Comprehensive Metabolic Panel; Future  -     Lipid Panel; Future    Mixed hyperlipidemia  -     CBC with Auto Differential; Future  -     Comprehensive Metabolic Panel; Future  -     Lipid Panel; Future    Tobacco abuse    Vaccine counseling    Other orders  -     losartan-hydroCHLOROthiazide (HYZAAR) 100-12.5 MG per tablet; Take 1 tablet by mouth daily  -     atorvastatin (LIPITOR) 10 MG tablet; Take 1 tablet by mouth daily  -     CBC with Auto Differential  -     Comprehensive Metabolic Panel  -     Lipid Panel  -     Cardiovascular Report      Offered heklp with tobacco addiction she states she is cutting nback

## 2025-03-31 ENCOUNTER — TELEPHONE (OUTPATIENT)
Age: 65
End: 2025-03-31

## 2025-03-31 NOTE — TELEPHONE ENCOUNTER
Called and lvm for patient regarding 4/25 appt. [employer] insurance will be out of network with [facility] and patient will be self pay and paying out of pocket if they continue care with us.

## 2025-06-06 ENCOUNTER — OFFICE VISIT (OUTPATIENT)
Age: 65
End: 2025-06-06
Payer: COMMERCIAL

## 2025-06-06 VITALS
WEIGHT: 169.2 LBS | SYSTOLIC BLOOD PRESSURE: 131 MMHG | OXYGEN SATURATION: 96 % | TEMPERATURE: 98 F | RESPIRATION RATE: 18 BRPM | BODY MASS INDEX: 28.89 KG/M2 | HEART RATE: 92 BPM | HEIGHT: 64 IN | DIASTOLIC BLOOD PRESSURE: 60 MMHG

## 2025-06-06 DIAGNOSIS — Z72.0 TOBACCO ABUSE: ICD-10-CM

## 2025-06-06 DIAGNOSIS — E78.5 DYSLIPIDEMIA: ICD-10-CM

## 2025-06-06 DIAGNOSIS — I10 PRIMARY HYPERTENSION: Primary | ICD-10-CM

## 2025-06-06 PROCEDURE — 99214 OFFICE O/P EST MOD 30 MIN: CPT | Performed by: STUDENT IN AN ORGANIZED HEALTH CARE EDUCATION/TRAINING PROGRAM

## 2025-06-06 PROCEDURE — 3078F DIAST BP <80 MM HG: CPT | Performed by: STUDENT IN AN ORGANIZED HEALTH CARE EDUCATION/TRAINING PROGRAM

## 2025-06-06 PROCEDURE — 3075F SYST BP GE 130 - 139MM HG: CPT | Performed by: STUDENT IN AN ORGANIZED HEALTH CARE EDUCATION/TRAINING PROGRAM

## 2025-06-06 RX ORDER — LOSARTAN POTASSIUM AND HYDROCHLOROTHIAZIDE 12.5; 1 MG/1; MG/1
1 TABLET ORAL DAILY
Qty: 90 TABLET | Refills: 3 | Status: SHIPPED | OUTPATIENT
Start: 2025-06-06

## 2025-06-06 RX ORDER — ATORVASTATIN CALCIUM 10 MG/1
10 TABLET, FILM COATED ORAL DAILY
Qty: 90 TABLET | Refills: 3 | Status: SHIPPED | OUTPATIENT
Start: 2025-06-06

## 2025-06-06 SDOH — ECONOMIC STABILITY: FOOD INSECURITY: WITHIN THE PAST 12 MONTHS, YOU WORRIED THAT YOUR FOOD WOULD RUN OUT BEFORE YOU GOT MONEY TO BUY MORE.: NEVER TRUE

## 2025-06-06 SDOH — ECONOMIC STABILITY: FOOD INSECURITY: WITHIN THE PAST 12 MONTHS, THE FOOD YOU BOUGHT JUST DIDN'T LAST AND YOU DIDN'T HAVE MONEY TO GET MORE.: NEVER TRUE

## 2025-06-06 ASSESSMENT — PATIENT HEALTH QUESTIONNAIRE - PHQ9
1. LITTLE INTEREST OR PLEASURE IN DOING THINGS: NOT AT ALL
SUM OF ALL RESPONSES TO PHQ QUESTIONS 1-9: 0
2. FEELING DOWN, DEPRESSED OR HOPELESS: NOT AT ALL

## 2025-06-06 ASSESSMENT — ENCOUNTER SYMPTOMS
SHORTNESS OF BREATH: 0
EYE REDNESS: 0
WHEEZING: 0
CONSTIPATION: 0
VOMITING: 0
ABDOMINAL PAIN: 0
SINUS PAIN: 0
NAUSEA: 0
DIARRHEA: 0
COUGH: 0

## 2025-06-06 NOTE — PROGRESS NOTES
I have reviewed all needed documentation in preparation for visit. Verified patient by name and date of birth  Chief Complaint   Patient presents with    Establish Care       Vitals:    06/06/25 1527   BP: 131/60   BP Site: Left Upper Arm   Patient Position: Sitting   BP Cuff Size: Medium Adult   Pulse: 92   Resp: 18   Temp: 98 °F (36.7 °C)   TempSrc: Temporal   SpO2: 96%   Weight: 76.7 kg (169 lb 3.2 oz)   Height: 1.626 m (5' 4\")       Health Maintenance Due   Topic Date Due    HIV screen  Never done    DTaP/Tdap/Td vaccine (1 - Tdap) Never done    Pneumococcal 50+ years Vaccine (1 of 2 - PCV) Never done    Cervical cancer screen  Never done    Diabetes screen  Never done    Breast cancer screen  Never done    Colorectal Cancer Screen  Never done    Shingles vaccine (1 of 2) Never done    COVID-19 Vaccine (1 - 2024-25 season) Never done     \"Have you been to the ER, urgent care clinic since your last visit?  Hospitalized since your last visit?\"    NO    “Have you seen or consulted any other health care providers outside of [facility] since your last visit?”    NO    Have you had a mammogram?”   NO    No breast cancer screening on file      “Have you had a pap smear?”    NO    No cervical cancer screening on file         “Have you had a colorectal cancer screening such as a colonoscopy/FIT/Cologuard?    NO    No colonoscopy on file  No cologuard on file  No FIT/FOBT on file   No flexible sigmoidoscopy on file         Click Here for Release of Records Request         ANIA Payne

## 2025-06-06 NOTE — PROGRESS NOTES
Tami Mosley is a 64 y.o. female  Chief Complaint   Patient presents with    [facility]     Establish care      HPI  HTN: well controlled on losartan-HCTZ 100-12.5mg.     HLD: well controlled on lipitor 10mg. Last lipid panel in November within normal limits    Tobacco use disorder: less than half a pack per day    Follows with gyn and does pap smear and mammo through them        ROS  Review of Systems   Constitutional:  Negative for chills and fever.   HENT:  Negative for sinus pain.    Eyes:  Negative for redness.   Respiratory:  Negative for cough, shortness of breath and wheezing.    Cardiovascular:  Negative for chest pain.   Gastrointestinal:  Negative for abdominal pain, constipation, diarrhea, nausea and vomiting.   Endocrine: Negative for polyuria.   Genitourinary:  Negative for dysuria.   Musculoskeletal:  Negative for arthralgias and myalgias.   Neurological:  Negative for light-headedness and headaches.   Psychiatric/Behavioral:  Negative for agitation and dysphoric mood. The patient is not nervous/anxious.          Vitals:    06/06/25 1527   BP: 131/60   Pulse: 92   Resp: 18   Temp: 98 °F (36.7 °C)   SpO2: 96%      Wt Readings from Last 3 Encounters:   06/06/25 76.7 kg (169 lb 3.2 oz)   11/12/24 76.7 kg (169 lb)   11/09/23 77.3 kg (170 lb 6.4 oz)       EXAM  Physical Exam  Vitals and nursing note reviewed.   Constitutional:       Appearance: Normal appearance.   HENT:      Head: Normocephalic and atraumatic.   Eyes:      Conjunctiva/sclera: Conjunctivae normal.   Cardiovascular:      Rate and Rhythm: Normal rate and regular rhythm.      Pulses: Normal pulses.      Heart sounds: Normal heart sounds.   Pulmonary:      Effort: Pulmonary effort is normal.      Breath sounds: Normal breath sounds.   Abdominal:      General: Bowel sounds are normal.   Musculoskeletal:         General: Normal range of motion.   Skin:     General: Skin is warm and dry.   Neurological:      General: No focal deficit

## 2025-08-25 ENCOUNTER — COMMUNITY OUTREACH (OUTPATIENT)
Age: 65
End: 2025-08-25